# Patient Record
Sex: FEMALE | Race: WHITE | Employment: FULL TIME | URBAN - METROPOLITAN AREA
[De-identification: names, ages, dates, MRNs, and addresses within clinical notes are randomized per-mention and may not be internally consistent; named-entity substitution may affect disease eponyms.]

---

## 2017-01-03 ENCOUNTER — OFFICE VISIT (OUTPATIENT)
Dept: RHEUMATOLOGY | Age: 47
End: 2017-01-03

## 2017-01-03 VITALS
OXYGEN SATURATION: 99 % | BODY MASS INDEX: 45.99 KG/M2 | DIASTOLIC BLOOD PRESSURE: 83 MMHG | WEIGHT: 293 LBS | TEMPERATURE: 98.6 F | HEIGHT: 67 IN | HEART RATE: 76 BPM | SYSTOLIC BLOOD PRESSURE: 140 MMHG

## 2017-01-03 DIAGNOSIS — M32.9 LUPUS (HCC): Primary | ICD-10-CM

## 2017-01-03 RX ORDER — AZATHIOPRINE 50 MG/1
50 TABLET ORAL 2 TIMES DAILY
Qty: 120 TAB | Refills: 2 | Status: SHIPPED | OUTPATIENT
Start: 2017-01-03 | End: 2017-04-03

## 2017-01-03 RX ORDER — HYDROXYCHLOROQUINE SULFATE 200 MG/1
400 TABLET, FILM COATED ORAL DAILY
Qty: 180 TAB | Refills: 1 | Status: SHIPPED | OUTPATIENT
Start: 2017-01-03 | End: 2017-04-03

## 2017-01-03 RX ORDER — METHYLPREDNISOLONE 4 MG/1
TABLET ORAL
Refills: 0 | COMMUNITY
Start: 2016-12-31 | End: 2017-05-30 | Stop reason: ALTCHOICE

## 2017-01-03 NOTE — MR AVS SNAPSHOT
Visit Information Date & Time Provider Department Dept. Phone Encounter #  
 1/3/2017  4:30 PM Tre Louie MD [facility] [id number] Follow-up Instructions Return in about 3 months (around 4/3/2017). Upcoming Health Maintenance Date Due COLONOSCOPY 8/1/1986 BREAST CANCER SCRN MAMMOGRAM 4/13/2016 INFLUENZA AGE 9 TO ADULT 8/1/2016 PAP AKA CERVICAL CYTOLOGY 3/2/2018 DTaP/Tdap/Td series (2 - Td) 10/20/2024 Allergies as of 1/3/2017  Review Complete On: 1/3/2017 By: Radha Romo LPN Severity Noted Reaction Type Reactions Aspirin High 01/05/2015    Anaphylaxis Peanut High 01/05/2015    Anaphylaxis Other Plant, Animal, Environmental  06/29/2015    Runny Nose, Sneezing Hayfever Current Immunizations  Reviewed on 1/5/2015 Name Date Influenza Vaccine 10/20/2014 MMR 12/1/1989 TB Skin Test (PPD) 12/1/1989 Tdap 10/20/2014 Not reviewed this visit You Were Diagnosed With   
  
 Codes Comments Lupus ([facility] 75.)    -  Primary ICD-10-CM: M32.9 ICD-9-CM: 710.0 Vitals BP Pulse Temp Height(growth percentile) Weight(growth percentile) LMP  
 140/83 (BP 1 Location: Right arm, BP Patient Position: Sitting) 76 98.6 °F (37 °C) (Oral) 5' 7\" (1.702 m) (!) 351 lb 6.4 oz (159.4 kg) 12/03/2013 SpO2 BMI OB Status Smoking Status 99% 55.04 kg/m2 Hysterectomy Never Smoker Vitals History BMI and BSA Data Body Mass Index Body Surface Area 55.04 kg/m 2 2.75 m 2 Preferred Pharmacy Pharmacy Name Phone [address] [facility] [phone] Your Updated Medication List  
  
   
This list is accurate as of: 1/3/17  5:09 PM.  Always use your most recent med list.  
  
  
  
  
 azaTHIOprine 50 mg tablet Commonly known as:  The Pepsi Take 1 Tab by mouth two (2) times a day for 90 days. buPROPion  mg tablet Commonly known as:  WELLBUTRIN XL  
TAKE 1 TABLET BY MOUTH EVERY MORNING  
  
 citalopram 40 mg tablet Commonly known as:  CELEXA  
TAKE 1 TABLET BY MOUTH EVERY DAY  
  
 HYDROcodone-acetaminophen 5-325 mg per tablet Commonly known as:  Cindy Mcnair Take 1 Tab by mouth every four (4) hours as needed for Pain. Max Daily Amount: 6 Tabs. hydroxychloroquine 200 mg tablet Commonly known as:  PLAQUENIL Take 2 Tabs by mouth daily for 90 days. levothyroxine 150 mcg tablet Commonly known as:  SYNTHROID Take 1 Tab by mouth Daily (before breakfast). Needs labs LORazepam 1 mg tablet Commonly known as:  ATIVAN Take 1 Tab by mouth every eight (8) hours as needed for Anxiety. Max Daily Amount: 3 mg.  
  
 methylPREDNISolone 4 mg tablet Commonly known as:  Dianne Blajaymie TK UTD  
  
 metoprolol tartrate 50 mg tablet Commonly known as:  LOPRESSOR Take 1 Tab by mouth as needed. Take  50 mg 12 hours prior to testing and 1 hour before testing. naproxen sodium 220 mg tablet Commonly known as:  NAPROSYN Take 220 mg by mouth as needed. omeprazole 20 mg tablet Commonly known as:  PRILOSEC OTC Take 40 mg by mouth two (2) times a day. ondansetron 4 mg disintegrating tablet Commonly known as:  ZOFRAN ODT Take 1 Tab by mouth every eight (8) hours as needed for Nausea. pantoprazole 40 mg tablet Commonly known as:  PROTONIX Take 40 mg by mouth two (2) times a day. VITAMIN D3 2,000 unit Tab Generic drug:  cholecalciferol (vitamin D3) Take  by mouth. Prescriptions Sent to Pharmacy Refills  
 azaTHIOprine (IMURAN) 50 mg tablet 2 Sig: Take 1 Tab by mouth two (2) times a day for 90 days. Class: Normal  
 Pharmacy: [address], [address] [place] [address] Ph #: [phone]  Route: Oral  
 hydroxychloroquine (PLAQUENIL) 200 mg tablet 1  
 Sig: Take 2 Tabs by mouth daily for 90 days. Class: Normal  
 Pharmacy: [address], [address] [facility] #: [phone] Route: Oral  
  
Follow-up Instructions Return in about 3 months (around 4/3/2017). Introducing [facility] SERVICES! Dear Morrie Mohs: Thank you for requesting a [employer] account. Our records indicate that you already have an active [employer] account. You can access your account anytime at [email or web address] Did you know that you can access your hospital and ER discharge instructions at any time in [employer]? You can also review all of your test results from your hospital stay or ER visit. Additional Information If you have questions, please visit the Frequently Asked Questions section of the [employer] website at [email or web address] Remember, [employer] is NOT to be used for urgent needs. For medical emergencies, dial 911. Now available from your iPhone and Android! Please provide this summary of care documentation to your next provider. Your primary care clinician is listed as Charisma Prieto. If you have any questions after today's visit, please call [phone].

## 2017-01-03 NOTE — PROGRESS NOTES
RHEUMATOLOGY PROBLEM LIST AND CHIEF COMPLAINT  1. Lupus (1999) -  fatigue, arthralgia, rash (livedo reticularis), pleurisy, interstitial changes however no interstitial lung disease, proteinuria, fevers, dry eyes, dry mouth and Raynaud's phenomenon, SHREYA, SSA, SSB and double-stranded DNA. Therapy History:  Prior NSAIDs:   Prior DMARDs: Cellcept (approx. 6 months, stopped because of size of pill)  Current NSAIDs:  Current DMARDs: Imuran ([id number], restarted 09/2016-current), Hydroxychloroquine (stopped 10/2016, reordered 1/2017)    2. Sjogrens syndrome - dry eyes, dry mouth and Raynaud's phenomenon    INTERVAL HISTORY  This is a 48 y.o.  female. Today, the patient complains of pain in the chest    Severity:  4 on a scale of 0-10. Timing: all day, modifying factor being steroids  Context/Associated signs and symptoms: The patient states that she had a bad week last week and reports going to her primary care. She states that she was having chest pain while breathing and tenderness over the chest and ribs and thinks it was from costochondritis. She admits that this chest pain is worse when lying flat. She states that she was given a steroid dose pack which provided relief. She reports that she has had 3 upper respiratory infections since starting Imuran. She denies any improvement in her joint symptoms since her previous visit. She states that she stopped her plaquenil because she thought she was supposed to stop it when starting imuran. She continue on Imuran 50 mg BID. She reports that her most recent eye exam was good.      RHEUMATOLOGY REVIEW OF SYSTEMS   Positives as per history  Negatives as follows:  Osmel Choi:  Denies unexplained persistent fevers or weight change  RESPIRATORY:  No pleuritic pain, exertional dyspnea  CARDIOVASCULAR:  Denies chest pain  GASTRO:   Denies heartburn, abdominal pain, nausea, vomiting, diarrhea  SKIN:    Denies rash   MSK:    No morning stiffness >1 hour, persistent joint swelling, persistent joint pain    PAST MEDICAL HISTORY  Reviewed with patient, significant changes in medical history - Yes, recent episodes of pleurisy    FAMILY HISTORY  autoimmune thyroid disease - mother    PHYSICAL EXAM  Blood pressure 140/83, pulse 76, temperature 98.6 °F (37 °C), temperature source Oral, height 5' 7\" (1.702 m), weight (!) 351 lb 6.4 oz (159.4 kg), last menstrual period 12/03/2013, SpO2 99 %. GENERAL APPEARANCE: Well-nourished, no acute distress  NECK: Tender cervical lymphadenopathy  ENT: No oral ulcers  CARDIOVASCULAR: Heart rhythm is regular. No murmur, rub, gallop  CHEST: Normal vesicular breath sounds. No wheezes, rales, pleural friction rubs, no pleurisy today  ABDOMINAL: The abdomen is soft and nontender. Bowel sounds are normal  SKIN: No rash, palpable purpura, digital ulcer, abnormal thickening   MUSCULOSKELETAL:   Upper extremities - Tenderness over MCP's, puffiness has persisted  Lower extremities - bony prominence of bilateral knees with no tenderness on range of motion    LABS, RADIOLOGY AND PROCEDURES  Previous labs reviewed -Yes    ASSESSMENT  1. Lupus (Established problem - Progressive disease) - the patient's pleurisy has been flaring recently. This is likely because she stopped her plaquenil after starting Imuran. I explained that the two medications work synergistically and that she should continue taking both. I want her to restart plaquenil 400 mg daily and continue Imuran 50 mg BID. She has had multiple upper respiratory infections recently, so if she continues to get sick I will likely stop her Imuran and start her on a new medication, like Benlysta. She should finish her prednisone dose pack. I will check her toxicity labs today. She should return in 3 months for a follow up. 2. Sjogren syndrome (Established problem - Stable disease) - the patient's disease is stable. She will use symptomatic treatments for dry mouth.    3. Drug therapy monitoring for toxicity (plaquenil ) - CBC, BUN, Cr, AST, ALT and albumin every 3 months; eye exams every 6-12 months for retinal toxicity. PLAN  1. Restart Hydroxychloroquine 400 mg daily   2. Toxicity labs - CBC, BUN, Cr, AST, ALT and albumin  3. Imuran 50 mg BID    Stanislaw Deng MD, [address] [place] [address]   Adult and Pediatric Rheumatology     [facility] of [address] [place] [address], Phone [phone], Fax [phone]     Visiting  of Pediatrics    Department of Pediatrics, [address], Phone [phone], Fax [phone]    There are no Patient Instructions on file for this visit. Follow-up Disposition:  Return in about 3 months (around 4/3/2017). Inessa Gomez MD    Written by zulema Epps, as dictated by Justin Leong.  Jethro Deng M.D.

## 2017-01-04 LAB
ALBUMIN SERPL-MCNC: 4.3 G/DL (ref 3.5–5.5)
ALT SERPL-CCNC: 18 IU/L (ref 0–32)
AST SERPL-CCNC: 12 IU/L (ref 0–40)
BASOPHILS # BLD MANUAL: 0 X10E3/UL (ref 0–0.2)
BASOPHILS NFR BLD MANUAL: 0 %
BUN SERPL-MCNC: 15 MG/DL (ref 6–24)
CREAT SERPL-MCNC: 0.6 MG/DL (ref 0.57–1)
DIFFERENTIAL COMMENT, 115260: ABNORMAL
EOSINOPHIL # BLD MANUAL: 0.1 X10E3/UL (ref 0–0.4)
EOSINOPHIL NFR BLD MANUAL: 1 %
ERYTHROCYTE [DISTWIDTH] IN BLOOD BY AUTOMATED COUNT: 18.4 % (ref 12.3–15.4)
HCT VFR BLD AUTO: 36.7 % (ref 34–46.6)
HGB BLD-MCNC: 11.1 G/DL (ref 11.1–15.9)
LYMPHOCYTES # BLD MANUAL: 2 X10E3/UL (ref 0.7–3.1)
LYMPHOCYTES NFR BLD MANUAL: 14 %
MCH RBC QN AUTO: 20.9 PG (ref 26.6–33)
MCHC RBC AUTO-ENTMCNC: 30.2 G/DL (ref 31.5–35.7)
MCV RBC AUTO: 69 FL (ref 79–97)
MONOCYTES # BLD MANUAL: 0.7 X10E3/UL (ref 0.1–0.9)
MONOCYTES NFR BLD MANUAL: 5 %
NEUTROPHILS # BLD MANUAL: 11.4 X10E3/UL (ref 1.4–7)
NEUTROPHILS NFR BLD MANUAL: 80 %
NRBC BLD AUTO-RTO: 0 %
PLATELET # BLD AUTO: 535 X10E3/UL (ref 150–379)
PLATELET BLD QL SMEAR: ABNORMAL
RBC # BLD AUTO: 5.31 X10E6/UL (ref 3.77–5.28)
RBC MORPH BLD: ABNORMAL
WBC # BLD AUTO: 14.2 X10E3/UL (ref 3.4–10.8)

## 2017-02-14 ENCOUNTER — TELEPHONE (OUTPATIENT)
Dept: RHEUMATOLOGY | Age: 47
End: 2017-02-14

## 2017-02-14 RX ORDER — PREDNISONE 5 MG/1
TABLET ORAL
Qty: 30 TAB | Refills: 1 | Status: ON HOLD | OUTPATIENT
Start: 2017-02-14 | End: 2017-08-25

## 2017-02-14 RX ORDER — PREDNISONE 5 MG/1
TABLET ORAL
Qty: 30 TAB | Refills: 1 | Status: SHIPPED | OUTPATIENT
Start: 2017-02-14 | End: 2017-02-14 | Stop reason: SDUPTHER

## 2017-02-14 NOTE — TELEPHONE ENCOUNTER
Patient would like a call back from nurse she is having a lupus flare joints hurting fatigue no soon appointments available [phone]

## 2017-02-14 NOTE — TELEPHONE ENCOUNTER
Returned patient's call [phone] she has been experiencing increased joint pain all over as well as chest discomfort and shortness of breath at times. Patient currently is taking Plaquenil & Imuran as prescribed . Would like to know if you could send a steroid dose pack to [facility] on file.

## 2017-05-16 ENCOUNTER — TELEPHONE (OUTPATIENT)
Dept: RHEUMATOLOGY | Age: 47
End: 2017-05-16

## 2017-05-16 NOTE — TELEPHONE ENCOUNTER
Patient would like a call back from nurse states possible lupus flare up she did a finger stick and her hemoglobin was 8.9  [phone]

## 2017-05-17 ENCOUNTER — TELEPHONE (OUTPATIENT)
Dept: RHEUMATOLOGY | Age: 47
End: 2017-05-17

## 2017-05-17 DIAGNOSIS — D63.8 ANEMIA IN OTHER CHRONIC DISEASES CLASSIFIED ELSEWHERE: ICD-10-CM

## 2017-05-17 DIAGNOSIS — M32.12 SYSTEMIC LUPUS ERYTHEMATOSUS (SLE) WITH PERICARDITIS, UNSPECIFIED SLE TYPE (HCC): ICD-10-CM

## 2017-05-17 DIAGNOSIS — M35.00 SJOGREN'S SYNDROME, WITH UNSPECIFIED ORGAN INVOLVEMENT (HCC): Primary | ICD-10-CM

## 2017-05-17 NOTE — TELEPHONE ENCOUNTER
Called patient she feels she may be having a lupus flare she did a finger stick and her hemoglobin was 8.9. She is not sure why it would be low considering she had a hysterectomy fours ago so she does not have monthly periods. She has been experiencing extreme fatigue and some shortness of breath. Patient would like to  Know if this could be related to her Lupus or any of the medications she currently is taking. Please advise how to proceed.

## 2017-05-18 NOTE — TELEPHONE ENCOUNTER
Called patient advised per Dr Mario Rico it can be related to Lupus.  We can check some labs to see if she is having hemolytic anemia. Patient stated she would like to have labs drawn.

## 2017-05-26 LAB
ALBUMIN SERPL-MCNC: 4.1 G/DL (ref 3.5–5.5)
ALBUMIN/GLOB SERPL: 1.5 {RATIO} (ref 1.2–2.2)
ALP SERPL-CCNC: 91 IU/L (ref 39–117)
ALT SERPL-CCNC: 11 IU/L (ref 0–32)
ANA NUCLEAR DOTS TITR SER IF: ABNORMAL {TITER}
ANA SPECKLED TITR SER: ABNORMAL {TITER}
ANA TITR SER IF: POSITIVE {TITER}
APPEARANCE UR: CLEAR
AST SERPL-CCNC: 14 IU/L (ref 0–40)
BACTERIA #/AREA URNS HPF: NORMAL /[HPF]
BASOPHILS # BLD MANUAL: 0 X10E3/UL (ref 0–0.2)
BASOPHILS NFR BLD MANUAL: 0 %
BILIRUB SERPL-MCNC: 0.4 MG/DL (ref 0–1.2)
BILIRUB UR QL STRIP: NEGATIVE
BUN SERPL-MCNC: 10 MG/DL (ref 6–24)
BUN/CREAT SERPL: 16 (ref 9–23)
C3 SERPL-MCNC: 176 MG/DL (ref 82–167)
C4 SERPL-MCNC: 25 MG/DL (ref 14–44)
CALCIUM SERPL-MCNC: 9.1 MG/DL (ref 8.7–10.2)
CASTS URNS QL MICRO: NORMAL /LPF
CHLORIDE SERPL-SCNC: 98 MMOL/L (ref 96–106)
CO2 SERPL-SCNC: 24 MMOL/L (ref 18–29)
COLOR UR: YELLOW
CREAT SERPL-MCNC: 0.63 MG/DL (ref 0.57–1)
CREAT UR-MCNC: 137.9 MG/DL
CRP SERPL-MCNC: 15 MG/L (ref 0–4.9)
DIFFERENTIAL COMMENT, 115260: ABNORMAL
DSDNA (NDNA) SCRN BY CRITHIDIA: NORMAL TITER
EOSINOPHIL # BLD MANUAL: 0.3 X10E3/UL (ref 0–0.4)
EOSINOPHIL NFR BLD MANUAL: 3 %
EPI CELLS #/AREA URNS HPF: NORMAL /HPF
ERYTHROCYTE [DISTWIDTH] IN BLOOD BY AUTOMATED COUNT: 18.2 % (ref 12.3–15.4)
ERYTHROCYTE [SEDIMENTATION RATE] IN BLOOD BY WESTERGREN METHOD: 22 MM/HR (ref 0–32)
FOLATE SERPL-MCNC: 7.5 NG/ML
GLOBULIN SER CALC-MCNC: 2.8 G/DL (ref 1.5–4.5)
GLUCOSE SERPL-MCNC: 122 MG/DL (ref 65–99)
GLUCOSE UR QL: NEGATIVE
HCT VFR BLD AUTO: 32.2 % (ref 34–46.6)
HGB BLD-MCNC: 9.3 G/DL (ref 11.1–15.9)
HGB UR QL STRIP: NEGATIVE
IGA SERPL-MCNC: 438 MG/DL (ref 87–352)
IGG SERPL-MCNC: 1025 MG/DL (ref 700–1600)
IGM SERPL-MCNC: 72 MG/DL (ref 26–217)
IRON SATN MFR SERPL: 5 % (ref 15–55)
IRON SERPL-MCNC: 18 UG/DL (ref 27–159)
KETONES UR QL STRIP: NEGATIVE
LEUKOCYTE ESTERASE UR QL STRIP: NEGATIVE
LYMPHOCYTES # BLD MANUAL: 1.6 X10E3/UL (ref 0.7–3.1)
LYMPHOCYTES NFR BLD MANUAL: 17 %
Lab: ABNORMAL
MCH RBC QN AUTO: 19.3 PG (ref 26.6–33)
MCHC RBC AUTO-ENTMCNC: 28.9 G/DL (ref 31.5–35.7)
MCV RBC AUTO: 67 FL (ref 79–97)
MICRO URNS: NORMAL
MICRO URNS: NORMAL
MONOCYTES # BLD MANUAL: 0.4 X10E3/UL (ref 0.1–0.9)
MONOCYTES NFR BLD MANUAL: 4 %
MUCOUS THREADS URNS QL MICRO: PRESENT
NEUTROPHILS # BLD MANUAL: 7.1 X10E3/UL (ref 1.4–7)
NEUTROPHILS NFR BLD MANUAL: 76 %
NITRITE UR QL STRIP: NEGATIVE
PH UR STRIP: 6 [PH] (ref 5–7.5)
PLATELET # BLD AUTO: 508 X10E3/UL (ref 150–379)
PLATELET BLD QL SMEAR: ABNORMAL
POTASSIUM SERPL-SCNC: 4.1 MMOL/L (ref 3.5–5.2)
PROT SERPL-MCNC: 6.9 G/DL (ref 6–8.5)
PROT UR QL STRIP: NEGATIVE
PROT UR-MCNC: 12 MG/DL
RBC # BLD AUTO: 4.82 X10E6/UL (ref 3.77–5.28)
RBC #/AREA URNS HPF: NORMAL /HPF
RBC MORPH BLD: ABNORMAL
SODIUM SERPL-SCNC: 140 MMOL/L (ref 134–144)
SP GR UR: 1.02 (ref 1–1.03)
TIBC SERPL-MCNC: 370 UG/DL (ref 250–450)
UIBC SERPL-MCNC: 352 UG/DL (ref 131–425)
URINALYSIS REFLEX, 377202: NORMAL
UROBILINOGEN UR STRIP-MCNC: 0.2 MG/DL (ref 0.2–1)
VIT B12 SERPL-MCNC: 416 PG/ML (ref 211–946)
WBC # BLD AUTO: 9.4 X10E3/UL (ref 3.4–10.8)
WBC #/AREA URNS HPF: NORMAL /HPF

## 2017-05-30 ENCOUNTER — OFFICE VISIT (OUTPATIENT)
Dept: RHEUMATOLOGY | Age: 47
End: 2017-05-30

## 2017-05-30 VITALS
TEMPERATURE: 98.1 F | BODY MASS INDEX: 45.99 KG/M2 | OXYGEN SATURATION: 98 % | SYSTOLIC BLOOD PRESSURE: 119 MMHG | HEIGHT: 67 IN | RESPIRATION RATE: 16 BRPM | WEIGHT: 293 LBS | HEART RATE: 87 BPM | DIASTOLIC BLOOD PRESSURE: 77 MMHG

## 2017-05-30 DIAGNOSIS — M32.19 OTHER SYSTEMIC LUPUS ERYTHEMATOSUS WITH OTHER ORGAN INVOLVEMENT (HCC): Primary | ICD-10-CM

## 2017-05-30 RX ORDER — AZATHIOPRINE 50 MG/1
150 TABLET ORAL DAILY
Qty: 270 TAB | Refills: 3 | Status: ON HOLD | OUTPATIENT
Start: 2017-05-30 | End: 2017-08-25 | Stop reason: SDUPTHER

## 2017-05-30 RX ORDER — AZATHIOPRINE 50 MG/1
50 TABLET ORAL 3 TIMES DAILY
COMMUNITY
End: 2018-08-09 | Stop reason: SDUPTHER

## 2017-05-30 RX ORDER — HYDROXYCHLOROQUINE SULFATE 200 MG/1
200 TABLET, FILM COATED ORAL 2 TIMES DAILY
COMMUNITY
End: 2019-03-25 | Stop reason: ALTCHOICE

## 2017-05-30 NOTE — MR AVS SNAPSHOT
Visit Information Date & Time Provider Department Dept. Phone Encounter #  
 5/30/2017  4:30 PM Aniceto Lawrence MD [facility] [id number] Upcoming Health Maintenance Date Due COLONOSCOPY 8/1/1986 BREAST CANCER SCRN MAMMOGRAM 4/13/2016 INFLUENZA AGE 9 TO ADULT 8/1/2017 PAP AKA CERVICAL CYTOLOGY 3/2/2018 DTaP/Tdap/Td series (2 - Td) 10/20/2024 Allergies as of 5/30/2017  Review Complete On: 5/30/2017 By: Lucy Bassett LPN Severity Noted Reaction Type Reactions Aspirin High 01/05/2015    Anaphylaxis Peanut High 01/05/2015    Anaphylaxis Other Plant, Animal, Environmental  06/29/2015    Runny Nose, Sneezing Hayfever Current Immunizations  Reviewed on 1/5/2015 Name Date Influenza Vaccine 10/20/2014 MMR 12/1/1989 TB Skin Test (PPD) 12/1/1989 Tdap 10/20/2014 Not reviewed this visit You Were Diagnosed With   
  
 Codes Comments Other systemic lupus erythematosus with other organ involvement ([facility]    -  Primary ICD-10-CM: M32.19 Vitals BP Pulse Temp Resp Height(growth percentile) Weight(growth percentile) 119/77 (BP 1 Location: Right arm, BP Patient Position: Sitting) 87 98.1 °F (36.7 °C) (Oral) 16 5' 7\" (1.702 m) 349 lb 12.8 oz (158.7 kg) LMP SpO2 BMI OB Status Smoking Status 12/03/2013 98% 54.79 kg/m2 Hysterectomy Never Smoker BMI and BSA Data Body Mass Index Body Surface Area 54.79 kg/m 2 2.74 m 2 Preferred Pharmacy Pharmacy Name Phone [facility] [phone] Your Updated Medication List  
  
   
This list is accurate as of: 5/30/17  4:58 PM.  Always use your most recent med list.  
  
  
  
  
 buPROPion  mg tablet Commonly known as:  WELLBUTRIN XL  
TAKE 1 TABLET BY MOUTH EVERY MORNING  
  
 citalopram 40 mg tablet Commonly known as:  Omid Bowles TAKE 1 TABLET BY MOUTH EVERY DAY  
  
 HYDROcodone-acetaminophen 5-325 mg per tablet Commonly known as:  Desire Jian Take 1 Tab by mouth every four (4) hours as needed for Pain. Max Daily Amount: 6 Tabs. * IMURAN 50 mg tablet Generic drug:  azaTHIOprine Take 50 mg by mouth two (2) times a day. * azaTHIOprine 50 mg tablet Commonly known as:  The Pepsi Take 3 Tabs by mouth daily for 90 days. levothyroxine 150 mcg tablet Commonly known as:  SYNTHROID Take 1 Tab by mouth Daily (before breakfast). Needs labs LORazepam 1 mg tablet Commonly known as:  ATIVAN Take 1 Tab by mouth every eight (8) hours as needed for Anxiety. Max Daily Amount: 3 mg.  
  
 metoprolol tartrate 50 mg tablet Commonly known as:  LOPRESSOR Take 1 Tab by mouth as needed. Take  50 mg 12 hours prior to testing and 1 hour before testing. naproxen sodium 220 mg tablet Commonly known as:  NAPROSYN Take 220 mg by mouth as needed. omeprazole 20 mg tablet Commonly known as:  PRILOSEC OTC Take 40 mg by mouth two (2) times a day. ondansetron 4 mg disintegrating tablet Commonly known as:  ZOFRAN ODT Take 1 Tab by mouth every eight (8) hours as needed for Nausea. pantoprazole 40 mg tablet Commonly known as:  PROTONIX Take 40 mg by mouth two (2) times a day. PLAQUENIL 200 mg tablet Generic drug:  hydroxychloroquine Take 200 mg by mouth two (2) times a day. predniSONE 5 mg tablet Commonly known as:  DELTASONE  
20mg x 3 days, 15mg x 3 days, 10mg x 3 days, 5mg x 3 days VITAMIN D3 2,000 unit Tab Generic drug:  cholecalciferol (vitamin D3) Take  by mouth. * Notice: This list has 2 medication(s) that are the same as other medications prescribed for you. Read the directions carefully, and ask your doctor or other care provider to review them with you. Prescriptions Sent to Pharmacy  Refills  
 azaTHIOprine (IMURAN) 50 mg tablet 3  
 Sig: Take 3 Tabs by mouth daily for 90 days. Class: Normal  
 Pharmacy: [address], [address] [facility] #: [phone] Route: Oral  
  
Introducing [facility] SERVICES! Dear Cherry Gale: Thank you for requesting a [employer] account. Our records indicate that you already have an active [employer] account. You can access your account anytime at [email or web address] Did you know that you can access your hospital and ER discharge instructions at any time in [employer]? You can also review all of your test results from your hospital stay or ER visit. Additional Information If you have questions, please visit the Frequently Asked Questions section of the [employer] website at [email or web address] Remember, [employer] is NOT to be used for urgent needs. For medical emergencies, dial 911. Now available from your iPhone and Android! Please provide this summary of care documentation to your next provider. Your primary care clinician is listed as Mikayla Brown. If you have any questions after today's visit, please call [phone].

## 2017-05-30 NOTE — PROGRESS NOTES
RHEUMATOLOGY PROBLEM LIST AND CHIEF COMPLAINT  1. Lupus (1999) -  fatigue, arthralgia, rash (livedo reticularis), pleurisy, interstitial changes however no interstitial lung disease, proteinuria, fevers, dry eyes, dry mouth and Raynaud's phenomenon, SHREYA, SSA, SSB and double-stranded DNA. Therapy History:  Prior DMARDs: Cellcept (approx. 6 months, stopped because of size of pill)  Current DMARDs: Imuran ([id number], restarted 09/2016-current), Hydroxychloroquine (stopped 10/2016, 1/2017-current)    2. Sjogrens syndrome - dry eyes, dry mouth and Raynaud's phenomenon    INTERVAL HISTORY  This is a 48 y.o.  female. Today, the patient complains of pain in the joints. Location: hand  Severity:  0 on a scale of 0-10  Timing: all day   Duration:  4 months  Modifying factors: Imuran, Prednisone, HCQ  Context/Associated signs and symptoms: The patient states that her joints are doing well today, but she admits to having more pain \"with the bad weather. \" She recently had a flare with pain, fatigue, and hand swelling so she was started on prednisone course with relief. She notes that her recent labs have showed anemia. She restarted plaquenil 400 mg daily and she continues on Imuran 50 mg BID. RHEUMATOLOGY REVIEW OF SYSTEMS   Positives as per history  Negatives as follows:  Catalina Saba:  Denies unexplained persistent fevers or weight change  RESPIRATORY:  No pleuritic pain, exertional dyspnea  CARDIOVASCULAR:  Denies chest pain  GASTRO:   Denies heartburn, abdominal pain, nausea, vomiting, diarrhea  SKIN:    Denies rash   MSK:    No morning stiffness >1 hour, persistent joint swelling, persistent joint pain    PAST MEDICAL HISTORY  Reviewed with patient, significant changes in medical history - Yes, recent flare    FAMILY HISTORY  autoimmune thyroid disease - mother    PHYSICAL EXAM  Blood pressure 119/77, pulse 87, temperature 98.1 °F (36.7 °C), temperature source Oral, resp.  rate 16, height 5' 7\" (1.702 m), weight 349 lb 12.8 oz (158.7 kg), last menstrual period 12/03/2013, SpO2 98 %. GENERAL APPEARANCE: Well-nourished, no acute distress  NECK: Tender cervical lymphadenopathy  ENT: No oral ulcers  CARDIOVASCULAR: Heart rhythm is regular. No murmur, rub, gallop  CHEST: Normal vesicular breath sounds. No wheezes, rales, pleural friction rubs, no pleurisy today  ABDOMINAL: The abdomen is soft and nontender. Bowel sounds are normal  SKIN: No rash, palpable purpura, digital ulcer, abnormal thickening   MUSCULOSKELETAL:   Upper extremities - full range of motion, no tenderness, no swelling, no synovial thickening and no deformity of joints  Lower extremities - bony prominence of bilateral knees with no tenderness on range of motion    LABS, RADIOLOGY AND PROCEDURES  Previous labs reviewed -Yes    ASSESSMENT  1. Lupus (Established problem - Stable disease) - the patient flared recently. She was started on prednisone and her flare resolved. I explained that she may need additional treatment to prevent flares, so I will increase her Imuran to 150 mg daily. I want her to continue on plaquenil 400 mg daily along with her Imuran. I believe that her anemia is a result of chronic inflammation, so I will continue to monitor this. 2. Sjogren syndrome (Established problem - Stable disease) - the patient's disease is stable. She will use symptomatic treatments for dry mouth. 3. Drug therapy monitoring for toxicity (plaquenil ) - CBC, BUN, Cr, AST, ALT and albumin every 3 months; eye exams every 6-12 months for retinal toxicity. monitor   PLAN  1. Hydroxychloroquine 400 mg daily   2. Increase Imuran to 150 mg daily    Stanislaw Muniz MD  Adult and Pediatric Rheumatology     [address] [place] [address], Phone [phone], Fax [phone]     Visiting  of Pediatrics    Department of Pediatrics, [facility] [id number], [address] [place] [address], Phone [phone], Fax [phone]    There are no Patient Instructions on file for this visit. cc:  Dorothy Ellington MD    Written by zulema Mcghee, as dictated by Jimbo Pollard.  Latasha Britton M.D.

## 2017-06-08 ENCOUNTER — TELEPHONE (OUTPATIENT)
Dept: RHEUMATOLOGY | Age: 47
End: 2017-06-08

## 2017-06-08 RX ORDER — PREDNISONE 5 MG/1
TABLET ORAL
Qty: 30 TAB | Refills: 1 | Status: ON HOLD | OUTPATIENT
Start: 2017-06-08 | End: 2017-08-25

## 2017-07-20 ENCOUNTER — TELEPHONE (OUTPATIENT)
Dept: RHEUMATOLOGY | Age: 47
End: 2017-07-20

## 2017-07-20 NOTE — TELEPHONE ENCOUNTER
----- Message from Kt Fritz sent at 7/20/2017  1:47 PM EDT -----  Regarding: FW: Dr. Alpa Dietz      ----- Message -----     From: Dakotah Scott     Sent: 7/20/2017  12:44 PM       To: [facility] Front Office Pool  Subject: Dr. Alpa Dietz                              Pt requesting call back from nurse. Lupus is flaring up. She need to know what to do. Best contact number [phone].

## 2017-07-20 NOTE — TELEPHONE ENCOUNTER
Returned patient's call she has been experiencing increased joint pain all over, discomfort in her chest as well as her lower legs with extreme fatigue. She thinks some of it is related to her being anemic as well as a flare going on. Would like Prednisone sent to [facility] pharmacy unless you have any other suggestions.

## 2017-07-21 RX ORDER — PREDNISONE 5 MG/1
TABLET ORAL
Qty: 30 TAB | Refills: 1 | Status: ON HOLD | OUTPATIENT
Start: 2017-07-21 | End: 2017-08-25

## 2017-08-25 ENCOUNTER — ANESTHESIA (OUTPATIENT)
Dept: ENDOSCOPY | Age: 47
End: 2017-08-25
Payer: COMMERCIAL

## 2017-08-25 ENCOUNTER — HOSPITAL ENCOUNTER (OUTPATIENT)
Age: 47
Setting detail: OUTPATIENT SURGERY
Discharge: HOME | End: 2017-08-25
Attending: INTERNAL MEDICINE | Admitting: INTERNAL MEDICINE
Payer: COMMERCIAL

## 2017-08-25 ENCOUNTER — ANESTHESIA EVENT (OUTPATIENT)
Dept: ENDOSCOPY | Age: 47
End: 2017-08-25
Payer: COMMERCIAL

## 2017-08-25 VITALS
BODY MASS INDEX: 45.99 KG/M2 | RESPIRATION RATE: 19 BRPM | HEART RATE: 74 BPM | WEIGHT: 293 LBS | HEIGHT: 67 IN | OXYGEN SATURATION: 99 % | SYSTOLIC BLOOD PRESSURE: 110 MMHG | TEMPERATURE: 98.5 F | DIASTOLIC BLOOD PRESSURE: 63 MMHG

## 2017-08-25 PROCEDURE — 74011000250 HC RX REV CODE- 250

## 2017-08-25 PROCEDURE — 74011250636 HC RX REV CODE- 250/636

## 2017-08-25 PROCEDURE — 88305 TISSUE EXAM BY PATHOLOGIST: CPT | Performed by: INTERNAL MEDICINE

## 2017-08-25 PROCEDURE — 77030019988 HC FCPS ENDOSC DISP BSC -B: Performed by: INTERNAL MEDICINE

## 2017-08-25 PROCEDURE — 74011250636 HC RX REV CODE- 250/636: Performed by: INTERNAL MEDICINE

## 2017-08-25 PROCEDURE — 76060000032 HC ANESTHESIA 0.5 TO 1 HR: Performed by: INTERNAL MEDICINE

## 2017-08-25 PROCEDURE — 76040000007: Performed by: INTERNAL MEDICINE

## 2017-08-25 RX ORDER — PROPOFOL 10 MG/ML
INJECTION, EMULSION INTRAVENOUS AS NEEDED
Status: DISCONTINUED | OUTPATIENT
Start: 2017-08-25 | End: 2017-08-25 | Stop reason: HOSPADM

## 2017-08-25 RX ORDER — SODIUM CHLORIDE 0.9 % (FLUSH) 0.9 %
5-10 SYRINGE (ML) INJECTION AS NEEDED
Status: DISCONTINUED | OUTPATIENT
Start: 2017-08-25 | End: 2017-08-25 | Stop reason: HOSPADM

## 2017-08-25 RX ORDER — MIDAZOLAM HYDROCHLORIDE 1 MG/ML
1-3 INJECTION, SOLUTION INTRAMUSCULAR; INTRAVENOUS
Status: DISCONTINUED | OUTPATIENT
Start: 2017-08-25 | End: 2017-08-25 | Stop reason: HOSPADM

## 2017-08-25 RX ORDER — NALOXONE HYDROCHLORIDE 0.4 MG/ML
0.4 INJECTION, SOLUTION INTRAMUSCULAR; INTRAVENOUS; SUBCUTANEOUS
Status: DISCONTINUED | OUTPATIENT
Start: 2017-08-25 | End: 2017-08-25 | Stop reason: HOSPADM

## 2017-08-25 RX ORDER — SUCRALFATE 1 G/10ML
1 SUSPENSION ORAL 4 TIMES DAILY
Qty: 560 ML | Refills: 0 | Status: SHIPPED | OUTPATIENT
Start: 2017-08-25 | End: 2017-09-08

## 2017-08-25 RX ORDER — MIDAZOLAM HYDROCHLORIDE 1 MG/ML
.25-5 INJECTION, SOLUTION INTRAMUSCULAR; INTRAVENOUS
Status: DISCONTINUED | OUTPATIENT
Start: 2017-08-25 | End: 2017-08-25 | Stop reason: HOSPADM

## 2017-08-25 RX ORDER — FLUMAZENIL 0.1 MG/ML
0.2 INJECTION INTRAVENOUS
Status: DISCONTINUED | OUTPATIENT
Start: 2017-08-25 | End: 2017-08-25 | Stop reason: HOSPADM

## 2017-08-25 RX ORDER — ATROPINE SULFATE 0.1 MG/ML
0.5 INJECTION INTRAVENOUS
Status: DISCONTINUED | OUTPATIENT
Start: 2017-08-25 | End: 2017-08-25 | Stop reason: HOSPADM

## 2017-08-25 RX ORDER — EPINEPHRINE 0.1 MG/ML
1 INJECTION INTRACARDIAC; INTRAVENOUS
Status: DISCONTINUED | OUTPATIENT
Start: 2017-08-25 | End: 2017-08-25 | Stop reason: HOSPADM

## 2017-08-25 RX ORDER — DEXTROMETHORPHAN/PSEUDOEPHED 2.5-7.5/.8
1.2 DROPS ORAL
Status: DISCONTINUED | OUTPATIENT
Start: 2017-08-25 | End: 2017-08-25 | Stop reason: HOSPADM

## 2017-08-25 RX ORDER — SODIUM CHLORIDE 0.9 % (FLUSH) 0.9 %
5-10 SYRINGE (ML) INJECTION EVERY 8 HOURS
Status: DISCONTINUED | OUTPATIENT
Start: 2017-08-25 | End: 2017-08-25 | Stop reason: HOSPADM

## 2017-08-25 RX ORDER — LIDOCAINE HYDROCHLORIDE 20 MG/ML
INJECTION, SOLUTION EPIDURAL; INFILTRATION; INTRACAUDAL; PERINEURAL AS NEEDED
Status: DISCONTINUED | OUTPATIENT
Start: 2017-08-25 | End: 2017-08-25 | Stop reason: HOSPADM

## 2017-08-25 RX ORDER — SODIUM CHLORIDE 9 MG/ML
75 INJECTION, SOLUTION INTRAVENOUS CONTINUOUS
Status: DISCONTINUED | OUTPATIENT
Start: 2017-08-25 | End: 2017-08-25 | Stop reason: HOSPADM

## 2017-08-25 RX ADMIN — PROPOFOL 100 MG: 10 INJECTION, EMULSION INTRAVENOUS at 14:07

## 2017-08-25 RX ADMIN — LIDOCAINE HYDROCHLORIDE 50 MG: 20 INJECTION, SOLUTION EPIDURAL; INFILTRATION; INTRACAUDAL; PERINEURAL at 14:07

## 2017-08-25 RX ADMIN — PROPOFOL 180 MG: 10 INJECTION, EMULSION INTRAVENOUS at 14:17

## 2017-08-25 RX ADMIN — SODIUM CHLORIDE 75 ML/HR: 900 INJECTION, SOLUTION INTRAVENOUS at 13:46

## 2017-08-25 RX ADMIN — PROPOFOL 250 MG: 10 INJECTION, EMULSION INTRAVENOUS at 14:28

## 2017-08-25 NOTE — PROCEDURES
[place] Office: [phone]      Esophagogastroduodenoscopy Procedure Note      Ashley Alicea  1970  [id number]    Indication: NATANAEL     : Linda Coffey MD    Referring Provider:  Sonia Melo MD    Sedation:  MAC anesthesia Propofol    Procedure Details:  After detailed informed consent was obtained for the procedure, with all risks and benefits of procedure explained the patient was taken to the endoscopy suite and placed in the left lateral decubitus position. Following sequential administration of sedation as per above, the endoscope was inserted into the mouth and advanced under direct vision to second portion of the duodenum. A careful inspection was made as the gastroscope was withdrawn, including a retroflexed view of the proximal stomach; findings and interventions are described below. Findings:     Esophagus: The esophageal mucosa in the proximal esophagus is normal.  Severe ulcerative/grade D esophagitis is noted starting at 27 cm. Long segment Miranda's compatible mucosa is noted. A large >7 cm hiatal hernia is noted. No Gagandeep's lesions are noted      Stomach: The gastric mucosa has erythema in the body with a few polyps-biopsies taken. .   The fundus was found to be normal with no lesions noted on retroflexion. The angularis is normal as well. Duodenum:   The bulb and post bulbar mucosa is normal in appearance. The duodenal folds are normal.     Therapies:  biopsy of stomach body,polyp    Specimen:  Specimens were collected as described and send to the laboratory. Complications:   None were encountered during the procedure. EBL:  None. Recommendations:     -Acid suppression with a proton pump inhibitor and carafate,   -Await pathology. ,   -Follow symptoms.  -No NSAIDs. -Repeat EGD in 2 months for esophageal biopsies and to confirm healing      Thank you for entrusting me with this patient's care.  Please do not hesitate to contact me with any questions or if I can be of assistance with any of your other patients' GI needs. Frandy Vila MD  8/25/2017  2:14 PM

## 2017-08-25 NOTE — ANESTHESIA PREPROCEDURE EVALUATION
Anesthetic History   No history of anesthetic complications            Review of Systems / Medical History  Patient summary reviewed, nursing notes reviewed and pertinent labs reviewed    Pulmonary          Shortness of breath         Neuro/Psych   Within defined limits           Cardiovascular  Within defined limits                Exercise tolerance: >4 METS     GI/Hepatic/Renal     GERD          Comments: Miranda's Esophagus Endo/Other      Hypothyroidism  Morbid obesity     Other Findings   Comments: Systemic Lupus Erythematosus  Sjogren's Syndrome         Physical Exam    Airway  Mallampati: III  TM Distance: > 6 cm  Neck ROM: normal range of motion   Mouth opening: Normal     Cardiovascular  Regular rate and rhythm,  S1 and S2 normal,  no murmur, click, rub, or gallop             Dental  No notable dental hx       Pulmonary  Breath sounds clear to auscultation               Abdominal  GI exam deferred       Other Findings            Anesthetic Plan    ASA: 3  Anesthesia type: general and total IV anesthesia          Induction: Intravenous  Anesthetic plan and risks discussed with: Patient

## 2017-08-25 NOTE — PROGRESS NOTES
Jerrod Day  1970  [id number]    Situation:  Verbal report received from: Deandra Wallace RN  Procedure: Procedure(s):  COLONOSCOPY  ESOPHAGOGASTRODUODENOSCOPY (EGD)  ESOPHAGOGASTRODUODENAL (EGD) BIOPSY    Background:    Preoperative diagnosis: GERD, IRON DEFICIENCY ANEMIA, COLON POLYPS  Postoperative diagnosis: long segment witt's esophagus; hiatal hernia; dverticulosis    :  Dr. Magalie Ricks  Assistant(s): Endoscopy Technician-1: Santiago Osborn [address] [place] [address]  Endoscopy RN-1: Kimmie Meeks    Specimens:   ID Type Source Tests Collected by Time Destination   1 : gastric biopsy for pathology Preservative Gastric  Eve Leavitt MD 8/25/2017 1416 Pathology     H. Pylori  no    Assessment:  Intra-procedure medications   Anesthesia gave intra-procedure sedation and medications, see anesthesia flow sheet yes    Intravenous fluids: NS@ KVO     Vital signs stable     Abdominal assessment: round and soft     Recommendation:  Discharge patient per MD order.   Family or Friend - Rich,   Permission to share finding with family or friend yes

## 2017-08-25 NOTE — PERIOP NOTES
Endoscope was pre-cleaned at bedside immediately following procedure by [facility], [facility].

## 2017-08-25 NOTE — PROCEDURES
Colonoscopy Procedure Note    Angela Peacock  1970  [id number]        Pre-operative Diagnosis: LOIS DEFICIENCY ANEMIA    Post-operative Diagnosis: internal hemorrhoids, diverticulosis      : Frandy Lucas MD    Referring Provider: Olayinka Marion MD    Sedation:  MAC anesthesia Propofol        Procedure Details:    After detailed informed consent was obtained with all risks and benefits of procedure explained and preoperative exam completed, the patient was taken to the endoscopy suite and placed in the left lateral decubitus position. Upon sequential sedation as per above, a digital rectal exam was performed  And was normal.  The Olympus videocolonoscope  was inserted in the rectum and carefully advanced to the cecum, which was identified by the ileocecal valve. The quality of preparation was fair. The colonoscope was slowly withdrawn with careful evaluation between folds. Retroflexion in the rectum was performed. Findings:   · The patient had desaturations and excessive snoring suggesting sleep apnea. She was restless throughout the procedure and more sedation could not be given as a result. · Mild barotrauma related changes were seen in the right colon and hence insufflation was kept at a minimum. · No large masses are noted. · A few sigmoid diverticula are seen. · Internal hemorrhoids are noted. · Cecum had some stool        Therapies:  none    Specimen:  none       Complications: None were encountered during the procedure. EBL:  None. Recommendations:     -Repeat colonoscopy in 3 years (prep, sedation related issues). -High fiber diet.    -Naturally, for new bleeding, unexplained weight loss,change in bowel habits and anemia, an earlier colonoscopy should be considered. Frandy Lucas MD  8/25/2017  2:26 PM

## 2017-08-25 NOTE — IP AVS SNAPSHOT
[address] [facility] 
[phone] Patient: Vilma Cortez MRN: [id number] RUU:7/5/1934 You are allergic to the following Allergen Reactions Aspirin Anaphylaxis Peanut Anaphylaxis Other Plant, Animal, Environmental Runny Nose Sneezing Hayfever Recent Documentation Height Weight Breastfeeding? BMI OB Status Smoking Status 1.702 m 157.1 kg No 54.23 kg/m2 Hysterectomy Never Smoker Emergency Contacts Name Discharge Info Relation Home Work Mobile Jeremiah Graves DISCHARGE CAREGIVER [3] Spouse [3] [phone] About your hospitalization You were admitted on:  August 25, 2017 You last received care in the:  [facility] ENDOSCOPY You were discharged on:  August 25, 2017 Unit phone number:  [phone] Why you were hospitalized Your primary diagnosis was:  Not on File Providers Seen During Your Hospitalizations Provider Role Specialty Primary office phone Prince Hernández MD Attending Provider Gastroenterology [phone] Your Primary Care Physician (PCP) Primary Care Physician Office Phone Office Fax Priyanka Hampton [phone] Follow-up Information None Your Appointments Tuesday August 29, 2017  4:30 PM EDT  
ESTABLISHED PATIENT with Jordana Huerta MD  
[address]  
[phone] Current Discharge Medication List  
  
START taking these medications Dose & Instructions Dispensing Information Comments Morning Noon Evening Bedtime  
 sucralfate 100 mg/mL suspension Commonly known as:  Jyoti Reyes Your last dose was: Your next dose is:    
   
   
 Dose:  1 g Take 10 mL by mouth four (4) times daily for 14 days. Quantity:  560 mL Refills:  0 STOP taking these medications   
 naproxen sodium 220 mg tablet Commonly known as:  NAPROSYN  
   
  
  
ASK your doctor about these medications Dose & Instructions Dispensing Information Comments Morning Noon Evening Bedtime buPROPion  mg tablet Commonly known as:  Theodor Care Your last dose was: Your next dose is: TAKE 1 TABLET BY MOUTH EVERY MORNING Quantity:  30 Tab Refills:  2  
     
   
   
   
  
 citalopram 40 mg tablet Commonly known as:  Angelina Finder Your last dose was: Your next dose is: TAKE 1 TABLET BY MOUTH EVERY DAY Quantity:  30 Tab Refills:  6 IMURAN 50 mg tablet Generic drug:  azaTHIOprine What changed:  Another medication with the same name was removed. Continue taking this medication, and follow the directions you see here. Your last dose was: Your next dose is:    
   
   
 Dose:  50 mg Take 50 mg by mouth three (3) times daily. Indications: SYSTEMIC LUPUS ERYTHEMATOSUS Refills:  0  
     
   
   
   
  
 levothyroxine 150 mcg tablet Commonly known as:  SYNTHROID Your last dose was: Your next dose is:    
   
   
 Dose:  150 mcg Take 1 Tab by mouth Daily (before breakfast). Needs labs Quantity:  15 Tab Refills:  0  
     
   
   
   
  
 pantoprazole 40 mg tablet Commonly known as:  PROTONIX Your last dose was: Your next dose is:    
   
   
 Dose:  40 mg Take 40 mg by mouth two (2) times a day. Refills:  4 PLAQUENIL 200 mg tablet Generic drug:  hydroxychloroquine Your last dose was: Your next dose is:    
   
   
 Dose:  200 mg Take 200 mg by mouth two (2) times a day. Refills:  0  
     
   
   
   
  
 VITAMIN D3 2,000 unit Tab Generic drug:  cholecalciferol (vitamin D3) Your last dose was: Your next dose is: Take  by mouth. Refills:  0 Where to Get Your Medications Information on where to get these meds will be given to you by the nurse or doctor. ! Ask your nurse or doctor about these medications  
  sucralfate 100 mg/mL suspension Discharge Instructions [place] Office: [phone] April Gama 
[id number] 
1970 EGD/COLONOSCOPY DISCHARGE INSTRUCTIONS Discomfort: 
Sore throat- throat lozenges or warm salt water gargle 
redness at IV site- apply warm compress to area; if redness or soreness persist- contact your physician Gaseous discomfort- walking, belching will help relieve any discomfort You may not operate a vehicle for 12 hours You may not engage in an occupation involving machinery or appliances for rest of today. You may not drink alcoholic beverages for at least 12 hours Avoid making any critical decisions for at least 24 hour DIET You may resume your regular diet  however -  remember your colon is empty and a heavy meal will produce gas. Avoid these foods:  fried / greasy foods, excessive carbonated drinks or too much caffeine MEDICATIONS Regarding Aspirin or Nonsteroidal medications specifically, please see below. ACTIVITY You may resume your normal daily activities. Spend the remainder of the day resting -  avoid any strenuous activity. CALL M.D. ANY SIGN OF Increasing pain, nausea, vomiting Abdominal distension (swelling) New increased bleeding (oral or rectal) Fever (chills) Pain in chest area Bloody discharge from nose or mouth Shortness of breath You may not take any Advil, Aspirin, Ibuprofen, Motrin, Aleve, or Goodys for 7 days, ONLY  Tylenol as needed for pain. Follow-up Instructions: 
 Call  Frandy Zafar MD for any questions or concerns Results of procedure / biopsy in 7 days Telephone # [phone] Follow-up Information None MyChart Activation Thank you for requesting access to [employer]. Please follow the instructions below to securely access and download your online medical record. [employer] allows you to send messages to your doctor, view your test results, renew your prescriptions, schedule appointments, and more. How Do I Sign Up? 1. In your internet browser, go to [email or web address] 
2. Click on the First Time User? Click Here link in the Sign In box. You will be redirect to the New Member Sign Up page. 3. Enter your [employer] Access Code exactly as it appears below. You will not need to use this code after youve completed the sign-up process. If you do not sign up before the expiration date, you must request a new code. [employer] Access Code: Activation code not generated Current [employer] Status: Active (This is the date your [employer] access code will ) 4. Enter the last four digits of your Social Security Number (xxxx) and Date of Birth (mm/dd/yyyy) as indicated and click Submit. You will be taken to the next sign-up page. 5. Create a [employer] ID. This will be your [employer] login ID and cannot be changed, so think of one that is secure and easy to remember. 6. Create a [employer] password. You can change your password at any time. 7. Enter your Password Reset Question and Answer. This can be used at a later time if you forget your password. 8. Enter your e-mail address. You will receive e-mail notification when new information is available in [address] 9. Click Sign Up. You can now view and download portions of your medical record. 10. Click the Download Summary menu link to download a portable copy of your medical information. Additional Information If you have questions, please visit the Frequently Asked Questions section of the [employer] website at [email or web address] Remember, [employer] is NOT to be used for urgent needs. For medical emergencies, dial 911. Discharge Orders None Introducing [facility] & HEALTH SERVICES! Dear Brenda Mondragon: Thank you for requesting a [employer] account. Our records indicate that you already have an active [employer] account. You can access your account anytime at [email or web address] Did you know that you can access your hospital and ER discharge instructions at any time in [employer]? You can also review all of your test results from your hospital stay or ER visit. Additional Information If you have questions, please visit the Frequently Asked Questions section of the [employer] website at [email or web address] Remember, [employer] is NOT to be used for urgent needs. For medical emergencies, dial 911. Now available from your iPhone and Android! General Information Please provide this summary of care documentation to your next provider. Patient Signature:  ____________________________________________________________ Date:  ____________________________________________________________  
  
Mariah Dillard Provider Signature:  ____________________________________________________________ Date:  ____________________________________________________________

## 2017-08-25 NOTE — H&P
Pre-endoscopy H and P    The patient was seen and examined in the room/pre-op holding area. The airway was assessed and documented. The problem list, past medical history, and medications were reviewed. Patient Active Problem List   Diagnosis Code    Systemic lupus erythematosus ([facility] M32.9    Sjogren's syndrome ([facility] M35.00    Obesities, morbid ([facility] E66.01    Hypothyroidism due to acquired atrophy of thyroid E03.4    Shortness of breath R06.02     Social History     Social History    Marital status:      Spouse name: N/A    Number of children: N/A    Years of education: N/A     Occupational History    Not on file. Social History Main Topics    Smoking status: Never Smoker    Smokeless tobacco: Never Used    Alcohol use No    Drug use: No    Sexual activity: Yes     Partners: Male     Other Topics Concern    Not on file     Social History Narrative     Past Medical History:   Diagnosis Date    Anemia     Iron deficiency    Miranda's esophagus     GERD (gastroesophageal reflux disease)     Lupus ([facility]     Sjoegren syndrome ([facility]     Thyroid disease          Prior to Admission Medications   Prescriptions Last Dose Informant Patient Reported? Taking?   azaTHIOprine (IMURAN) 50 mg tablet 8/24/2017 at Unknown time  Yes Yes   Sig: Take 50 mg by mouth three (3) times daily. Indications: SYSTEMIC LUPUS ERYTHEMATOSUS   buPROPion XL (WELLBUTRIN XL) 150 mg tablet 8/24/2017 at Unknown time  No Yes   Sig: TAKE 1 TABLET BY MOUTH EVERY MORNING   cholecalciferol, vitamin D3, (VITAMIN D3) 2,000 unit tab 8/24/2017 at Unknown time  Yes Yes   Sig: Take  by mouth.   citalopram (CELEXA) 40 mg tablet 8/24/2017 at Unknown time  No Yes   Sig: TAKE 1 TABLET BY MOUTH EVERY DAY   hydroxychloroquine (PLAQUENIL) 200 mg tablet 8/24/2017 at Unknown time  Yes Yes   Sig: Take 200 mg by mouth two (2) times a day.    levothyroxine (SYNTHROID) 150 mcg tablet 8/24/2017 at Unknown time  No Yes   Sig: Take 1 Tab by mouth Daily (before breakfast). Needs labs   naproxen sodium (NAPROSYN) 220 mg tablet   Yes No   Sig: Take 220 mg by mouth as needed. pantoprazole (PROTONIX) 40 mg tablet 8/24/2017 at Unknown time  Yes Yes   Sig: Take 40 mg by mouth two (2) times a day. Facility-Administered Medications: None           The review of systems is:  Negative  for shortness of breath or chest pain      The heart, lungs, and mental status were satisfactory for the administration of deep sedation and for the procedure. I discussed with the patient the objectives, risks, consequences and alternatives to the procedure.       Sandy King MD  8/25/2017  2:04 PM

## 2017-08-25 NOTE — ANESTHESIA POSTPROCEDURE EVALUATION
Post-Anesthesia Evaluation and Assessment    Patient: Jake Edwards MRN: [id number]  SSN: [id number]    YOB: 1970  Age: 50 y.o. Sex: female       Cardiovascular Function/Vital Signs  Visit Vitals    /59    Pulse 76    Temp 36.9 °C (98.5 °F)    Resp 14    Ht 5' 7\" (1.702 m)    Wt 157.1 kg (346 lb 4 oz)    SpO2 97%    Breastfeeding No    BMI 54.23 kg/m2       Patient is status post general, total IV anesthesia anesthesia for Procedure(s):  COLONOSCOPY  ESOPHAGOGASTRODUODENOSCOPY (EGD)  ESOPHAGOGASTRODUODENAL (EGD) BIOPSY. Nausea/Vomiting: None    Postoperative hydration reviewed and adequate. Pain:  Pain Scale 1: Numeric (0 - 10) (08/25/17 1343)  Pain Intensity 1: 0 (08/25/17 1343)   Managed    Neurological Status: At baseline    Mental Status and Level of Consciousness: Arousable    Pulmonary Status:   O2 Device: CO2 nasal cannula (08/25/17 1431)   Adequate oxygenation and airway patent    Complications related to anesthesia: None    Post-anesthesia assessment completed.  No concerns    Signed By: Khadijah Alcantar MD     August 25, 2017

## 2017-08-29 ENCOUNTER — OFFICE VISIT (OUTPATIENT)
Dept: RHEUMATOLOGY | Age: 47
End: 2017-08-29

## 2017-08-29 VITALS
HEIGHT: 67 IN | HEART RATE: 68 BPM | SYSTOLIC BLOOD PRESSURE: 123 MMHG | BODY MASS INDEX: 45.99 KG/M2 | WEIGHT: 293 LBS | OXYGEN SATURATION: 98 % | DIASTOLIC BLOOD PRESSURE: 85 MMHG | TEMPERATURE: 98.2 F | RESPIRATION RATE: 18 BRPM

## 2017-08-29 DIAGNOSIS — M32.8 OTHER FORMS OF SYSTEMIC LUPUS ERYTHEMATOSUS (HCC): Primary | ICD-10-CM

## 2017-08-29 NOTE — MR AVS SNAPSHOT
Visit Information Date & Time Provider Department Dept. Phone Encounter #  
 8/29/2017  4:30 PM Pippa Aguirre MD [address] [facility] [id number] Follow-up Instructions Return in about 4 months (around 12/29/2017). Upcoming Health Maintenance Date Due  
 BREAST CANCER SCRN MAMMOGRAM 4/13/2016 INFLUENZA AGE 9 TO ADULT 8/1/2017 PAP AKA CERVICAL CYTOLOGY 3/2/2018 DTaP/Tdap/Td series (2 - Td) 10/20/2024 COLONOSCOPY 8/25/2027 Allergies as of 8/29/2017  Review Complete On: 8/29/2017 By: Sue Zamora LPN Severity Noted Reaction Type Reactions Aspirin High 01/05/2015    Anaphylaxis Peanut High 01/05/2015    Anaphylaxis Other Plant, Animal, Environmental  06/29/2015    Runny Nose, Sneezing Hayfever Current Immunizations  Reviewed on 1/5/2015 Name Date Influenza Vaccine 10/20/2014 MMR 12/1/1989 TB Skin Test (PPD) 12/1/1989 Tdap 10/20/2014 Not reviewed this visit Vitals BP Pulse Temp Resp Height(growth percentile) Weight(growth percentile) 123/85 (BP 1 Location: Right arm, BP Patient Position: Sitting) 68 98.2 °F (36.8 °C) (Oral) 18 5' 7\" (1.702 m) 346 lb (156.9 kg) LMP SpO2 BMI OB Status Smoking Status 12/03/2013 98% 54.19 kg/m2 Hysterectomy Never Smoker Vitals History BMI and BSA Data Body Mass Index Body Surface Area  
 54.19 kg/m 2 2.72 m 2 Preferred Pharmacy Pharmacy Name Phone [facility] [phone] [phone] , [address] [place] [address] [phone] Your Updated Medication List  
  
   
This list is accurate as of: 8/29/17  4:48 PM.  Always use your most recent med list.  
  
  
  
  
 buPROPion  mg tablet Commonly known as:  WELLBUTRIN XL  
TAKE 1 TABLET BY MOUTH EVERY MORNING  
  
 citalopram 40 mg tablet Commonly known as:  Joon Orellana TAKE 1 TABLET BY MOUTH EVERY DAY IMURAN 50 mg tablet Generic drug:  azaTHIOprine Take 50 mg by mouth three (3) times daily. Indications: SYSTEMIC LUPUS ERYTHEMATOSUS  
  
 levothyroxine 150 mcg tablet Commonly known as:  SYNTHROID Take 1 Tab by mouth Daily (before breakfast). Needs labs  
  
 pantoprazole 40 mg tablet Commonly known as:  PROTONIX Take 40 mg by mouth two (2) times a day. PLAQUENIL 200 mg tablet Generic drug:  hydroxychloroquine Take 200 mg by mouth two (2) times a day. sucralfate 100 mg/mL suspension Commonly known as:  Deborah Carrel Take 10 mL by mouth four (4) times daily for 14 days. VITAMIN D3 2,000 unit Tab Generic drug:  cholecalciferol (vitamin D3) Take  by mouth. Follow-up Instructions Return in about 4 months (around 12/29/2017). Introducing [facility] & HEALTH SERVICES! Dear Mary Garcia: Thank you for requesting a [employer] account. Our records indicate that you already have an active [employer] account. You can access your account anytime at [email or web address] Did you know that you can access your hospital and ER discharge instructions at any time in [employer]? You can also review all of your test results from your hospital stay or ER visit. Additional Information If you have questions, please visit the Frequently Asked Questions section of the [email or web address] website at [email or web address] Remember, [email or web address] is NOT to be used for urgent needs. For medical emergencies, dial 911. Now available from your iPhone and Android! Please provide this summary of care documentation to your next provider. Your primary care clinician is listed as Fátima Martin. If you have any questions after today's visit, please call [phone].

## 2017-08-29 NOTE — PROGRESS NOTES
RHEUMATOLOGY PROBLEM LIST AND CHIEF COMPLAINT  1. Lupus (1999) -  fatigue, arthralgia, rash (livedo reticularis), pleurisy, interstitial changes however no interstitial lung disease, proteinuria, fevers, dry eyes, dry mouth and Raynaud's phenomenon, SHREYA, SSA, SSB and double-stranded DNA. Therapy History:  Prior DMARDs: Cellcept (approx. 6 months, stopped because of size of pill)  Current DMARDs: Imuran (5665-4675, restarted 09/2016-current), Hydroxychloroquine (stopped 10/2016, 1/2017-current)    2. Sjogrens syndrome - dry eyes, dry mouth and Raynaud's phenomenon    INTERVAL HISTORY  This is a 50 y.o.  female. Today, the patient complains of pain in the joints. Location: elbows, shoulders  Severity:  5 on a scale of 0-10  Timing: all day   Duration:  3 months  Context/Associated signs and symptoms: The patient states that her recent prednisone has helped some. She complains of occasional dry eyes and mouth. She continues on plaquenil 400 mg daily and Imuran 150 mg daily. There have been no adverse effects to the current medication. She reports taking iron supplement which has helped with her fatigue greatly. She reports a recent endoscopy and mentions esophageal bleeding. She states that is she unable to take NSAIDs as a result. She reports a normal colonoscopy.  S    RHEUMATOLOGY REVIEW OF SYSTEMS   Positives as per history  Negatives as follows:  Germain Titus:  Denies unexplained persistent fevers or weight change  RESPIRATORY:  No pleuritic pain, exertional dyspnea  CARDIOVASCULAR:  Denies chest pain  GASTRO:   Denies heartburn, abdominal pain, nausea, vomiting, diarrhea  SKIN:    Denies rash   MSK:    No morning stiffness >1 hour, persistent joint swelling, persistent joint pain    PAST MEDICAL HISTORY  Reviewed with patient, significant changes in medical history - no    FAMILY HISTORY  autoimmune thyroid disease - mother    PHYSICAL EXAM  Blood pressure 123/85, pulse 68, temperature 98.2 °F (36.8 °C), temperature source Oral, resp. rate 18, height 5' 7\" (1.702 m), weight 346 lb (156.9 kg), last menstrual period 12/03/2013, SpO2 98 %. GENERAL APPEARANCE: Well-nourished, no acute distress  NECK: Tender cervical lymphadenopathy, swelling (decreased)  ENT: No oral ulcers  CARDIOVASCULAR: Heart rhythm is regular. No murmur, rub, gallop  CHEST: Normal vesicular breath sounds. No wheezes, rales, pleural friction rubs, no pleurisy today  ABDOMINAL: The abdomen is soft and nontender. Bowel sounds are normal  SKIN: No rash, palpable purpura, digital ulcer, abnormal thickening   MUSCULOSKELETAL:   Upper extremities - full range of motion, no tenderness, no swelling, no synovial thickening and no deformity of joints  Lower extremities - bony prominence of bilateral knees with no tenderness on range of motion    LABS, RADIOLOGY AND PROCEDURES  Previous labs reviewed -Yes    ASSESSMENT  1. Lupus (Established problem - Stable disease) - The patient's joints seem good on exam. I explained that it is possible her iron deficiency is secondary to anemia from chronic inflammation so I suspect the patient's inflammation may not have been well-controlled in the first place. Since she has upcoming labs, I will wait until those results before changing her treatment. If she requires more prednisone then we will also consider adjusting treatment. For now, she should continue with plaquenil 400 mg daily and Imuran 150 mg daily. She should return in 4 months for a follow up. 2. Sjogren syndrome (Established problem - Stable disease) - the patient's disease is stable. She will use symptomatic treatments for dry mouth. 3. Drug therapy monitoring for toxicity (plaquenil ) - CBC, BUN, Cr, AST, ALT and albumin every 3 months; eye exams every 6-12 months for retinal toxicity. monitor     PLAN  1. Hydroxychloroquine 400 mg daily   2. Imuran 150 mg daily  3. Return in 4 months      Stanislaw Santana MD  Adult and Pediatric Rheumatology     [facility], [address], Phone [phone], Fax [phone]     Visiting  of Pediatrics    Department of Pediatrics, [address], [address] [place] [address], Phone [phone], Fax [phone]    There are no Patient Instructions on file for this visit. cc:  Holley Skiff, MD    Written by zulema Crockett, as dictated by Orly Faria.  Sabrina Guido M.D.

## 2018-05-31 ENCOUNTER — OFFICE VISIT (OUTPATIENT)
Dept: RHEUMATOLOGY | Age: 48
End: 2018-05-31

## 2018-05-31 VITALS
DIASTOLIC BLOOD PRESSURE: 89 MMHG | OXYGEN SATURATION: 98 % | RESPIRATION RATE: 16 BRPM | BODY MASS INDEX: 55.6 KG/M2 | HEART RATE: 81 BPM | WEIGHT: 293 LBS | SYSTOLIC BLOOD PRESSURE: 145 MMHG | TEMPERATURE: 98.1 F

## 2018-05-31 DIAGNOSIS — L93.1 SUBACUTE CUTANEOUS LUPUS ERYTHEMATOSUS: Primary | ICD-10-CM

## 2018-05-31 DIAGNOSIS — D50.8 OTHER IRON DEFICIENCY ANEMIA: ICD-10-CM

## 2018-05-31 RX ORDER — AZATHIOPRINE 50 MG/1
150 TABLET ORAL DAILY
Qty: 270 TAB | Refills: 2 | Status: SHIPPED | OUTPATIENT
Start: 2018-05-31 | End: 2018-06-28 | Stop reason: CLARIF

## 2018-05-31 RX ORDER — LIFITEGRAST 50 MG/ML
SOLUTION/ DROPS OPHTHALMIC
Refills: 3 | COMMUNITY
Start: 2018-05-03 | End: 2019-03-25 | Stop reason: ALTCHOICE

## 2018-05-31 RX ORDER — HYDROXYCHLOROQUINE SULFATE 200 MG/1
400 TABLET, FILM COATED ORAL DAILY
Qty: 180 TAB | Refills: 2 | Status: SHIPPED | OUTPATIENT
Start: 2018-05-31 | End: 2018-06-28 | Stop reason: CLARIF

## 2018-05-31 NOTE — PROGRESS NOTES
RHEUMATOLOGY PROBLEM LIST AND CHIEF COMPLAINT  1. Lupus (1999) -  fatigue, arthralgia, rash (livedo reticularis), pleurisy, interstitial changes however no interstitial lung disease, proteinuria, fevers, dry eyes, dry mouth and Raynaud's phenomenon, SHREYA, SSA, SSB and double-stranded DNA. Therapy History:  Prior DMARDs: Cellcept (approx. 6 months, stopped because of size of pill)  Current DMARDs: Imuran (2821-7117, restarted 09/2016-current), Hydroxychloroquine (stopped 10/2016, 1/2017-current)    2. Sjogrens syndrome - dry eyes, dry mouth and Raynaud's phenomenon    INTERVAL HISTORY  This is a 50 y.o.  female. Today, the patient complains of pain in the joints. Location: hand  Severity:  3 on a scale of 0-10  Timing: all day   Duration:  9 months  Context/Associated signs and symptoms: The patient states she is doing well. She complains of some joint pain that worsens with the weather. She denies any recent episodes of pleurisy. She continues with symptomatic treatment, plaquenil 400 mg daily, and Imuran 150 mg daily. She has no other complaints. We discussed her anemia. RHEUMATOLOGY REVIEW OF SYSTEMS   Positives as per history  Negatives as follows:  Matt [place]:  Denies unexplained persistent fevers or weight change  RESPIRATORY:  No pleuritic pain, exertional dyspnea  CARDIOVASCULAR:  Denies chest pain  GASTRO:   Denies heartburn, abdominal pain, nausea, vomiting, diarrhea  SKIN:    Denies rash   MSK:    No morning stiffness >1 hour, persistent joint swelling, persistent joint pain    PAST MEDICAL HISTORY  Reviewed with patient, significant changes in medical history - no    FAMILY HISTORY  autoimmune thyroid disease - mother    PHYSICAL EXAM  Blood pressure 145/89, pulse 81, temperature 98.1 °F (36.7 °C), temperature source Oral, resp. rate 16, weight (!) 355 lb (161 kg), last menstrual period 12/03/2013, SpO2 98 %.   GENERAL APPEARANCE: Well-nourished, no acute distress  NECK: Tender cervical lymphadenopathy, swelling (decreased)  ENT: No oral ulcers  CARDIOVASCULAR: Heart rhythm is regular. No murmur, rub, gallop  CHEST: Normal vesicular breath sounds. No wheezes, rales, pleural friction rubs  ABDOMINAL: The abdomen is soft and nontender. Bowel sounds are normal  SKIN: No rash, palpable purpura, digital ulcer, abnormal thickening   MUSCULOSKELETAL:   Upper extremities - full range of motion, no tenderness, no swelling, no synovial thickening and no deformity of joints  Lower extremities - bony prominence of bilateral knees with no tenderness on range of motion    LABS, RADIOLOGY AND PROCEDURES  Previous labs reviewed -Yes    ASSESSMENT  1. Lupus (Established problem - Stable disease) - Her disease seems well-managed with Imuran 150 mg daily and Plaquenil 400 mg daily. She should continue with her medications and return in 6 months for a follow up. I will check labs. 2. Sjogren syndrome (Established problem - Stable disease) - the patient's disease is stable. She will use symptomatic treatments for dry mouth. 3. Drug therapy monitoring for toxicity (plaquenil ) - CBC, BUN, Cr, AST, ALT and albumin every 3 months; eye exams every 6-12 months for retinal toxicity. monitor     PLAN  1. Hydroxychloroquine 400 mg daily   2. Imuran 150 mg daily  3. Lupus disease activity labs - CBC, CMP, ESR, CRP, DSDNDA, complements, UA, urine protein/creatinine   4. Iron Profile, TSH  5. Return in 6 months    Stanislaw Cooper MD  Adult and Pediatric Rheumatology     [facility] [address], Phone [phone], Fax [phone]     Visiting  of Pediatrics    Department of Pediatrics[address], Phone [phone], Fax [phone]    There are no Patient Instructions on file for this visit.     cc:  Olayinka Marion MD    Written by Cayla Nguyen zulema, as dictated by Marta Harden.  Sera Hernandez M.D.

## 2018-05-31 NOTE — MR AVS SNAPSHOT
[address] 
[phone] Patient: Ashley Alicea MRN: [id number] JLP:[birth year] Visit Information Date & Time Provider Department Dept. Phone Encounter #  
 5/31/2018  2:20 PM Massimo Gutierrez MD [address] [id number] Follow-up Instructions Return in about 6 months (around 11/30/2018). Upcoming Health Maintenance Date Due  
 BREAST CANCER SCRN MAMMOGRAM 4/13/2016 PAP AKA CERVICAL CYTOLOGY 3/2/2018 Influenza Age 5 to Adult 8/1/2018 DTaP/Tdap/Td series (2 - Td) 10/20/2024 COLONOSCOPY 8/25/2027 Allergies as of 5/31/2018  Review Complete On: 5/31/2018 By: Dipti Mensah LPN Severity Noted Reaction Type Reactions Aspirin High 01/05/2015    Anaphylaxis Peanut High 01/05/2015    Anaphylaxis Other Plant, Animal, Environmental  06/29/2015    Runny Nose, Sneezing Hayfever Current Immunizations  Reviewed on 1/5/2015 Name Date Influenza Vaccine 10/20/2014 MMR 12/1/1989 TB Skin Test (PPD) 12/1/1989 Tdap 10/20/2014 Not reviewed this visit You Were Diagnosed With   
  
 Codes Comments Subacute cutaneous lupus erythematosus    -  Primary ICD-10-CM: L93.1 ICD-9-CM: 695.4 Vitals BP Pulse Temp Resp Weight(growth percentile) LMP  
 145/89 (BP 1 Location: Right arm, BP Patient Position: Sitting) 81 98.1 °F (36.7 °C) (Oral) 16 (!) 355 lb (161 kg) 12/03/2013 SpO2 BMI OB Status Smoking Status 98% 55.6 kg/m2 Hysterectomy Never Smoker BMI and BSA Data Body Mass Index Body Surface Area  
 55.6 kg/m 2 2.76 m 2 Preferred Pharmacy [facility] Name Phone [phone] [facility] [phone], [address] AT [place] [address] [phone] Your Updated Medication List  
  
   
 This list is accurate as of 5/31/18  2:53 PM.  Always use your most recent med list.  
  
  
  
  
 buPROPion  mg tablet Commonly known as:  WELLBUTRIN XL  
TAKE 1 TABLET BY MOUTH EVERY MORNING  
  
 citalopram 40 mg tablet Commonly known as:  CELEXA  
TAKE 1 TABLET BY MOUTH EVERY DAY  
  
 * IMURAN 50 mg tablet Generic drug:  azaTHIOprine Take 50 mg by mouth three (3) times daily. Indications: SYSTEMIC LUPUS ERYTHEMATOSUS  
  
 * azaTHIOprine 50 mg tablet Commonly known as:  The [employer]epsi Take 3 Tabs by mouth daily for 90 days. levothyroxine 150 mcg tablet Commonly known as:  SYNTHROID Take 1 Tab by mouth Daily (before breakfast). Needs labs  
  
 pantoprazole 40 mg tablet Commonly known as:  PROTONIX Take 40 mg by mouth two (2) times a day. * PLAQUENIL 200 mg tablet Generic drug:  hydroxychloroquine Take 200 mg by mouth two (2) times a day. * hydroxychloroquine 200 mg tablet Commonly known as:  PLAQUENIL Take 2 Tabs by mouth daily. VITAMIN D3 2,000 unit Tab Generic drug:  cholecalciferol (vitamin D3) Take  by mouth. XIIDRA 5 % Dpet Generic drug:  lifitegrast  
INSTILL 1 DROP INTO BOTH EYES BID * Notice: This list has 4 medication(s) that are the same as other medications prescribed for you. Read the directions carefully, and ask your doctor or other care provider to review them with you. Prescriptions Sent to Pharmacy Refills  
 hydroxychloroquine (PLAQUENIL) 200 mg tablet 2 Sig: Take 2 Tabs by mouth daily. Class: Normal  
 Pharmacy: [facility] [address] [place] [address] Ph #: [phone] Route: Oral  
 azaTHIOprine (IMURAN) 50 mg tablet 2 Sig: Take 3 Tabs by mouth daily for 90 days.   
 Class: Normal  
 Pharmacy: [facility] [address] Ph #: [phone] Route: Oral  
  
We Performed the Following C REACTIVE PROTEIN, QT [65709 CPT(R)] CBC+PLATELET+HEM REVIEW [81502 CPT(R)] COMPLEMENT, C3 [id number] CPT(R)] COMPLEMENT, C4 [id number] CPT(R)] DSDNA (NDNA) SCRN BY EVELINA [[id number] Custom] IRON PROFILE [id number] CPT(R)] METABOLIC PANEL, COMPREHENSIVE [71790 CPT(R)] SED RATE (ESR) [id number] CPT(R)] TSH 3RD GENERATION [87035 CPT(R)] UA/M W/RFLX CULTURE, ROUTINE [[id number] Custom] Follow-up Instructions Return in about 6 months (around 11/30/2018). Introducing hospitals & HEALTH SERVICES! Dear Thang Hancock: Thank you for requesting a [employer] account. Our records indicate that you already have an active TappnGo account. You can access your account anytime at [email or web address]. [employer] Did you know that you can access your hospital and ER discharge instructions at any time in [employer]? You can also review all of your test results from your hospital stay or ER visit. Additional Information If you have questions, please visit the Frequently Asked Questions section of the TappnGo website at [email or web address] [email or web address]/. Remember, TappnGo is NOT to be used for urgent needs. For medical emergencies, dial 911. Now available from your iPhone and Android! Please provide this summary of care documentation to your next provider. Your primary care clinician is listed as Colleen Velasquez. If you have any questions after today's visit, please call [phone].

## 2018-06-02 LAB
APPEARANCE UR: CLEAR
BACTERIA #/AREA URNS HPF: ABNORMAL /[HPF]
BACTERIA UR CULT: ABNORMAL
BACTERIA UR CULT: ABNORMAL
BILIRUB UR QL STRIP: NEGATIVE
CASTS URNS QL MICRO: ABNORMAL /LPF
COLOR UR: YELLOW
EPI CELLS #/AREA URNS HPF: ABNORMAL /HPF
GLUCOSE UR QL: NEGATIVE
HGB UR QL STRIP: NEGATIVE
KETONES UR QL STRIP: NEGATIVE
LEUKOCYTE ESTERASE UR QL STRIP: ABNORMAL
MICRO URNS: ABNORMAL
MUCOUS THREADS URNS QL MICRO: PRESENT
NITRITE UR QL STRIP: NEGATIVE
PH UR STRIP: 7 [PH] (ref 5–7.5)
PROT UR QL STRIP: NEGATIVE
RBC #/AREA URNS HPF: ABNORMAL /HPF
SP GR UR: 1.01 (ref 1–1.03)
URINALYSIS REFLEX, 377202: ABNORMAL
UROBILINOGEN UR STRIP-MCNC: 0.2 MG/DL (ref 0.2–1)
WBC #/AREA URNS HPF: ABNORMAL /HPF

## 2018-06-05 LAB
ALBUMIN SERPL-MCNC: 4.3 G/DL (ref 3.5–5.5)
ALBUMIN/GLOB SERPL: 1.5 {RATIO} (ref 1.2–2.2)
ALP SERPL-CCNC: 83 IU/L (ref 39–117)
ALT SERPL-CCNC: 17 IU/L (ref 0–32)
AST SERPL-CCNC: 14 IU/L (ref 0–40)
BASOPHILS # BLD MANUAL: 0 X10E3/UL (ref 0–0.2)
BASOPHILS NFR BLD MANUAL: 0 %
BILIRUB SERPL-MCNC: 0.3 MG/DL (ref 0–1.2)
BUN SERPL-MCNC: 11 MG/DL (ref 6–24)
BUN/CREAT SERPL: 14 (ref 9–23)
C3 SERPL-MCNC: 177 MG/DL (ref 82–167)
C4 SERPL-MCNC: 25 MG/DL (ref 14–44)
CALCIUM SERPL-MCNC: 9.5 MG/DL (ref 8.7–10.2)
CHLORIDE SERPL-SCNC: 102 MMOL/L (ref 96–106)
CO2 SERPL-SCNC: 26 MMOL/L (ref 18–29)
CREAT SERPL-MCNC: 0.76 MG/DL (ref 0.57–1)
CRP SERPL-MCNC: 20.3 MG/L (ref 0–4.9)
DIFFERENTIAL COMMENT, 115260: ABNORMAL
DSDNA (NDNA) SCRN BY CRITHIDIA: NORMAL TITER
EOSINOPHIL # BLD MANUAL: 0.1 X10E3/UL (ref 0–0.4)
EOSINOPHIL NFR BLD MANUAL: 1 %
ERYTHROCYTE [DISTWIDTH] IN BLOOD BY AUTOMATED COUNT: 15.6 % (ref 12.3–15.4)
ERYTHROCYTE [SEDIMENTATION RATE] IN BLOOD BY WESTERGREN METHOD: 27 MM/HR (ref 0–32)
GFR SERPLBLD CREATININE-BSD FMLA CKD-EPI: 107 ML/MIN/1.73
GFR SERPLBLD CREATININE-BSD FMLA CKD-EPI: 92 ML/MIN/1.73
GLOBULIN SER CALC-MCNC: 2.9 G/DL (ref 1.5–4.5)
GLUCOSE SERPL-MCNC: 106 MG/DL (ref 65–99)
HCT VFR BLD AUTO: 38.9 % (ref 34–46.6)
HGB BLD-MCNC: 12.6 G/DL (ref 11.1–15.9)
IRON SATN MFR SERPL: 12 % (ref 15–55)
IRON SERPL-MCNC: 37 UG/DL (ref 27–159)
LYMPHOCYTES # BLD MANUAL: 1.4 X10E3/UL (ref 0.7–3.1)
LYMPHOCYTES NFR BLD MANUAL: 18 %
MCH RBC QN AUTO: 26.3 PG (ref 26.6–33)
MCHC RBC AUTO-ENTMCNC: 32.4 G/DL (ref 31.5–35.7)
MCV RBC AUTO: 81 FL (ref 79–97)
MONOCYTES # BLD MANUAL: 0.2 X10E3/UL (ref 0.1–0.9)
MONOCYTES NFR BLD MANUAL: 3 %
NEUTROPHILS # BLD MANUAL: 6.1 X10E3/UL (ref 1.4–7)
NEUTROPHILS NFR BLD MANUAL: 78 %
PLATELET # BLD AUTO: 298 X10E3/UL (ref 150–379)
PLATELET BLD QL SMEAR: ADEQUATE
POTASSIUM SERPL-SCNC: 4.2 MMOL/L (ref 3.5–5.2)
PROT SERPL-MCNC: 7.2 G/DL (ref 6–8.5)
RBC # BLD AUTO: 4.79 X10E6/UL (ref 3.77–5.28)
RBC MORPH BLD: ABNORMAL
SODIUM SERPL-SCNC: 142 MMOL/L (ref 134–144)
TIBC SERPL-MCNC: 314 UG/DL (ref 250–450)
TSH SERPL DL<=0.005 MIU/L-ACNC: 2.93 UIU/ML (ref 0.45–4.5)
UIBC SERPL-MCNC: 277 UG/DL (ref 131–425)
WBC # BLD AUTO: 7.8 X10E3/UL (ref 3.4–10.8)

## 2018-06-19 ENCOUNTER — HOSPITAL ENCOUNTER (EMERGENCY)
Age: 48
Discharge: HOME | End: 2018-06-19
Attending: EMERGENCY MEDICINE
Payer: COMMERCIAL

## 2018-06-19 ENCOUNTER — APPOINTMENT (OUTPATIENT)
Dept: ULTRASOUND IMAGING | Age: 48
End: 2018-06-19
Attending: EMERGENCY MEDICINE
Payer: COMMERCIAL

## 2018-06-19 VITALS
TEMPERATURE: 98 F | SYSTOLIC BLOOD PRESSURE: 146 MMHG | DIASTOLIC BLOOD PRESSURE: 75 MMHG | BODY MASS INDEX: 45.99 KG/M2 | OXYGEN SATURATION: 97 % | HEART RATE: 70 BPM | HEIGHT: 67 IN | WEIGHT: 293 LBS | RESPIRATION RATE: 18 BRPM

## 2018-06-19 DIAGNOSIS — K80.20 CALCULUS OF GALLBLADDER WITHOUT CHOLECYSTITIS WITHOUT OBSTRUCTION: Primary | ICD-10-CM

## 2018-06-19 DIAGNOSIS — K80.50 BILIARY COLIC: ICD-10-CM

## 2018-06-19 LAB
ALBUMIN SERPL-MCNC: 3.2 G/DL (ref 3.5–5)
ALBUMIN/GLOB SERPL: 0.8 {RATIO} (ref 1.1–2.2)
ALP SERPL-CCNC: 78 U/L (ref 45–117)
ALT SERPL-CCNC: 24 U/L (ref 12–78)
ANION GAP SERPL CALC-SCNC: 10 MMOL/L (ref 5–15)
APPEARANCE UR: CLEAR
AST SERPL-CCNC: 11 U/L (ref 15–37)
BACTERIA URNS QL MICRO: ABNORMAL /HPF
BASOPHILS # BLD: 0 K/UL (ref 0–0.1)
BASOPHILS NFR BLD: 0 % (ref 0–1)
BILIRUB SERPL-MCNC: 0.3 MG/DL (ref 0.2–1)
BILIRUB UR QL: NEGATIVE
BUN SERPL-MCNC: 10 MG/DL (ref 6–20)
BUN/CREAT SERPL: 13 (ref 12–20)
CALCIUM SERPL-MCNC: 8.8 MG/DL (ref 8.5–10.1)
CHLORIDE SERPL-SCNC: 108 MMOL/L (ref 97–108)
CO2 SERPL-SCNC: 23 MMOL/L (ref 21–32)
COLOR UR: ABNORMAL
CREAT SERPL-MCNC: 0.75 MG/DL (ref 0.55–1.02)
DIFFERENTIAL METHOD BLD: ABNORMAL
EOSINOPHIL # BLD: 0.2 K/UL (ref 0–0.4)
EOSINOPHIL NFR BLD: 3 % (ref 0–7)
EPITH CASTS URNS QL MICRO: ABNORMAL /LPF
ERYTHROCYTE [DISTWIDTH] IN BLOOD BY AUTOMATED COUNT: 16.9 % (ref 11.5–14.5)
GLOBULIN SER CALC-MCNC: 4.1 G/DL (ref 2–4)
GLUCOSE SERPL-MCNC: 138 MG/DL (ref 65–100)
GLUCOSE UR STRIP.AUTO-MCNC: NEGATIVE MG/DL
HCT VFR BLD AUTO: 38.6 % (ref 35–47)
HGB BLD-MCNC: 12.4 G/DL (ref 11.5–16)
HGB UR QL STRIP: NEGATIVE
IMM GRANULOCYTES # BLD: 0 K/UL (ref 0–0.04)
IMM GRANULOCYTES NFR BLD AUTO: 0 % (ref 0–0.5)
KETONES UR QL STRIP.AUTO: NEGATIVE MG/DL
LEUKOCYTE ESTERASE UR QL STRIP.AUTO: NEGATIVE
LIPASE SERPL-CCNC: 133 U/L (ref 73–393)
LYMPHOCYTES # BLD: 1.3 K/UL (ref 0.8–3.5)
LYMPHOCYTES NFR BLD: 15 % (ref 12–49)
MCH RBC QN AUTO: 27.1 PG (ref 26–34)
MCHC RBC AUTO-ENTMCNC: 32.1 G/DL (ref 30–36.5)
MCV RBC AUTO: 84.3 FL (ref 80–99)
MONOCYTES # BLD: 0.5 K/UL (ref 0–1)
MONOCYTES NFR BLD: 5 % (ref 5–13)
MUCOUS THREADS URNS QL MICRO: ABNORMAL /LPF
NEUTS SEG # BLD: 6.9 K/UL (ref 1.8–8)
NEUTS SEG NFR BLD: 77 % (ref 32–75)
NITRITE UR QL STRIP.AUTO: NEGATIVE
NRBC # BLD: 0 K/UL (ref 0–0.01)
NRBC BLD-RTO: 0 PER 100 WBC
PH UR STRIP: 6.5 [PH] (ref 5–8)
PLATELET # BLD AUTO: 278 K/UL (ref 150–400)
PMV BLD AUTO: 11 FL (ref 8.9–12.9)
POTASSIUM SERPL-SCNC: 3.8 MMOL/L (ref 3.5–5.1)
PROT SERPL-MCNC: 7.3 G/DL (ref 6.4–8.2)
PROT UR STRIP-MCNC: NEGATIVE MG/DL
RBC # BLD AUTO: 4.58 M/UL (ref 3.8–5.2)
RBC #/AREA URNS HPF: ABNORMAL /HPF (ref 0–5)
SODIUM SERPL-SCNC: 141 MMOL/L (ref 136–145)
SP GR UR REFRACTOMETRY: 1.02 (ref 1–1.03)
UA: UC IF INDICATED,UAUC: ABNORMAL
UROBILINOGEN UR QL STRIP.AUTO: 1 EU/DL (ref 0.2–1)
WBC # BLD AUTO: 8.9 K/UL (ref 3.6–11)
WBC URNS QL MICRO: ABNORMAL /HPF (ref 0–4)

## 2018-06-19 PROCEDURE — 85025 COMPLETE CBC W/AUTO DIFF WBC: CPT | Performed by: EMERGENCY MEDICINE

## 2018-06-19 PROCEDURE — 87086 URINE CULTURE/COLONY COUNT: CPT | Performed by: EMERGENCY MEDICINE

## 2018-06-19 PROCEDURE — 81001 URINALYSIS AUTO W/SCOPE: CPT | Performed by: EMERGENCY MEDICINE

## 2018-06-19 PROCEDURE — 76705 ECHO EXAM OF ABDOMEN: CPT

## 2018-06-19 PROCEDURE — 96374 THER/PROPH/DIAG INJ IV PUSH: CPT

## 2018-06-19 PROCEDURE — 99283 EMERGENCY DEPT VISIT LOW MDM: CPT

## 2018-06-19 PROCEDURE — 83690 ASSAY OF LIPASE: CPT | Performed by: EMERGENCY MEDICINE

## 2018-06-19 PROCEDURE — 87147 CULTURE TYPE IMMUNOLOGIC: CPT | Performed by: EMERGENCY MEDICINE

## 2018-06-19 PROCEDURE — 80053 COMPREHEN METABOLIC PANEL: CPT | Performed by: EMERGENCY MEDICINE

## 2018-06-19 PROCEDURE — 94762 N-INVAS EAR/PLS OXIMTRY CONT: CPT

## 2018-06-19 PROCEDURE — 74011250636 HC RX REV CODE- 250/636: Performed by: EMERGENCY MEDICINE

## 2018-06-19 PROCEDURE — 36415 COLL VENOUS BLD VENIPUNCTURE: CPT | Performed by: EMERGENCY MEDICINE

## 2018-06-19 PROCEDURE — 96375 TX/PRO/DX INJ NEW DRUG ADDON: CPT

## 2018-06-19 RX ORDER — ONDANSETRON 4 MG/1
4 TABLET, ORALLY DISINTEGRATING ORAL
Qty: 10 TAB | Refills: 0 | Status: SHIPPED | OUTPATIENT
Start: 2018-06-19 | End: 2019-03-25 | Stop reason: ALTCHOICE

## 2018-06-19 RX ORDER — ONDANSETRON 2 MG/ML
4 INJECTION INTRAMUSCULAR; INTRAVENOUS
Status: COMPLETED | OUTPATIENT
Start: 2018-06-19 | End: 2018-06-19

## 2018-06-19 RX ORDER — FENTANYL CITRATE 50 UG/ML
50 INJECTION, SOLUTION INTRAMUSCULAR; INTRAVENOUS
Status: COMPLETED | OUTPATIENT
Start: 2018-06-19 | End: 2018-06-19

## 2018-06-19 RX ORDER — HYDROCODONE BITARTRATE AND ACETAMINOPHEN 5; 325 MG/1; MG/1
1 TABLET ORAL
Qty: 20 TAB | Refills: 0 | Status: SHIPPED | OUTPATIENT
Start: 2018-06-19 | End: 2019-03-25 | Stop reason: ALTCHOICE

## 2018-06-19 RX ORDER — SODIUM CHLORIDE 0.9 % (FLUSH) 0.9 %
5-10 SYRINGE (ML) INJECTION AS NEEDED
Status: DISCONTINUED | OUTPATIENT
Start: 2018-06-19 | End: 2018-06-19 | Stop reason: HOSPADM

## 2018-06-19 RX ORDER — SODIUM CHLORIDE 0.9 % (FLUSH) 0.9 %
5-10 SYRINGE (ML) INJECTION EVERY 8 HOURS
Status: DISCONTINUED | OUTPATIENT
Start: 2018-06-19 | End: 2018-06-19 | Stop reason: HOSPADM

## 2018-06-19 RX ADMIN — FENTANYL CITRATE 50 MCG: 50 INJECTION, SOLUTION INTRAMUSCULAR; INTRAVENOUS at 02:50

## 2018-06-19 RX ADMIN — ONDANSETRON 4 MG: 2 INJECTION, SOLUTION INTRAMUSCULAR; INTRAVENOUS at 02:50

## 2018-06-19 NOTE — DISCHARGE INSTRUCTIONS
Low-Fat Diet for Gallbladder Disease: Care Instructions  Your Care Instructions    When you eat, the gallbladder releases bile, which helps you digest the fat in food. If you have an inflamed gallbladder, this may cause pain. A low-fat diet may give your gallbladder a rest so you can start to heal. Your doctor and dietitian can help you make an eating plan that does not irritate your digestive system. Always talk with your doctor or dietitian before you make changes in your diet. Follow-up care is a key part of your treatment and safety. Be sure to make and go to all appointments, and call your doctor if you are having problems. It's also a good idea to know your test results and keep a list of the medicines you take. How can you care for yourself at home? · Eat many small meals and snacks each day instead of three large meals. · Choose lean meats. ¨ Eat no more than 5 to 6½ ounces of meat a day. ¨ Cut off all fat you can see. ¨ Eat chicken and turkey without the skin. ¨ Many types of fish, such as salmon, lake trout, tuna, and herring, provide healthy omega-3 fat. But, avoid fish canned in oil, such as sardines in olive oil. ¨ Bake, broil, or grill meats, poultry, or fish instead of frying them in butter or fat. · Drink or eat nonfat or low-fat milk, yogurt, cheese, or other milk products each day. ¨ Read the labels on cheeses, and choose those with less than 5 grams of fat an ounce. ¨ Try fat-free sour cream, cream cheese, or yogurt. ¨ Avoid cream soups and cream sauces on pasta. ¨ Eat low-fat ice cream, frozen yogurt, or sorbet. Avoid regular ice cream.  · Eat whole-grain cereals, breads, crackers, rice, or pasta. Avoid high-fat foods such as croissants, scones, biscuits, waffles, doughnuts, muffins, granola, and high-fat breads. · Flavor your foods with herbs and spices (such as basil, tarragon, or mint), fat-free sauces, or lemon juice instead of butter.  You can also use butter substitutes, fat-free mayonnaise, or fat-free dressing. · Try applesauce, prune puree, or mashed bananas to replace some or all of the fat when you bake. · Limit fats and oils, such as butter, margarine, mayonnaise, and salad dressing, to no more than 1 tablespoon a meal.  · Avoid high-fat foods, such as:  ¨ Chocolate, whole milk, ice cream, and processed cheese. ¨ Fried or buttered foods. ¨ Sausage, salami, and chapin. ¨ Cinnamon rolls, cakes, pies, cookies, and other pastries. ¨ Prepared snack foods, such as potato chips, nut and granola bars, and mixed nuts. ¨ Coconut and avocado. · Learn how to read food labels for serving sizes and ingredients. Fast-food and convenience-food meals often have lots of fat. Where can you learn more? Go to http://[employer] Product[employer].info/. Enter K052 in the search box to learn more about \"Low-Fat Diet for Gallbladder Disease: Care Instructions. \"  Current as of: May 12, 2017  Content Version: 11.4  © 9760-6233 [employer]. Care instructions adapted under license by [employer] (which disclaims liability or warranty for this information). If you have questions about a medical condition or this instruction, always ask your healthcare professional. [place] [address] any warranty or liability for your use of this information. Biliary Colic: Care Instructions  Your Care Instructions    Biliary (say \"BILL-ee-air-ee\") colic is belly pain caused by gallbladder problems. It is usually caused by a gallstone moving through or blocking the common bile duct or cystic duct. Gallstones are stones that form in the gallbladder. They are made of cholesterol and other substances. The gallbladder is a small sac located just under the liver. It stores bile released by the liver. Bile helps you digest fats. Gallstones also can form in the common bile duct or cystic duct.  These ducts carry bile from the gallbladder and the liver to the small intestine. Gallstones may be as small as a grain of sand or as large as a golf ball. Gallstones that cause severe symptoms usually are treated with surgery to remove the gallbladder. If the first attack of biliary colic is mild, it is often safe to wait until you have had another attack before you think about having surgery. The doctor has checked you carefully, but problems can develop later. If you notice any problems or new symptoms, get medical treatment right away. Follow-up care is a key part of your treatment and safety. Be sure to make and go to all appointments, and call your doctor if you are having problems. It's also a good idea to know your test results and keep a list of the medicines you take. How can you care for yourself at home? · Take pain medicines exactly as directed. ¨ If the doctor gave you a prescription medicine for pain, take it as prescribed. ¨ If you are not taking a prescription pain medicine, ask your doctor if you can take an over-the-counter medicine. Read and follow all instructions on the label. · Avoid foods that cause symptoms, especially fatty foods. These can cause biliary colic. · You may need more tests to look at your gallbladder. When should you call for help? Call your doctor now or seek immediate medical care if:  ? · You have a fever. ? · You have new belly pain, or your pain gets worse. ? · There is a new or increasing yellow tint to your skin or the whites of your eyes. ? · Your urine is dark yellow-brown, or your stools are light-colored or white. ? · You cannot keep down fluids. ? Watch closely for changes in your health, and be sure to contact your doctor if:  ? · You do not get better as expected. ? · You are not getting better after 1 day (24 hours). Where can you learn more? Go to http://justine-lizz.info/. Enter F094 in the search box to learn more about \"Biliary Colic: Care Instructions. \"  Current as of:  May 12, 2017  Content Version: 11.4  © 8451-6538 [employer]. Care instructions adapted under license by [employer] (which disclaims liability or warranty for this information). If you have questions about a medical condition or this instruction, always ask your healthcare professional. [address] any warranty or liability for your use of this information.

## 2018-06-19 NOTE — ED NOTES
Bedside and Verbal shift change report given to [place] Elders, RN (oncoming nurse) by Samia Ireland RN (offgoing nurse). Report included the following information SBAR, Kardex, ED Summary, [facility] and Recent Results.

## 2018-06-19 NOTE — ED PROVIDER NOTES
EMERGENCY DEPARTMENT HISTORY AND PHYSICAL EXAM          Date: 2018  Patient Name: Demetrice Velázquez    History of Presenting Illness     Chief Complaint   Patient presents with    Abdominal Pain     started thrus night    Back Pain     started thrus night, mid back pain       History Provided By: Patient    HPI: Demetrice Velázquez is a 50 y.o. female, pmhx witt's esophagus / GERD / lupus, who presents ambulatory to the ED c/o progressively worsening diffuse periumbilical abd pain that radiates around her R flank and into her back over the last 3 days. Pt reports an additional single episode of nausea and vomiting. She states her pain was exacerbated after eating dinner yesterday evening and notes it is more bothersome with deep inspiration. She denies any recent medications for her current sxs. Pt denies any recent follow up with her PCP or GI specialist. She otherwise specifically denies any recent fever, chills, diarrhea, CP, SOB, lightheadedness, dizziness, numbness, weakness, tingling, BLE swelling, HA, heart palpitations, urinary sxs, changes in BM, changes in PO intake, melena, hematochezia, cough, or congestion. PCP: Isai Spears MD  GI: Inés Bowles    PMHx: Significant for witt's esophagus, GERD, lupus, Sjogren syndrome, anemia  PSHx: Significant for EGD, hysterectomy, colonoscopy,   Social Hx: -tobacco, -EtOH, -Illicit Drugs    There are no other complaints, changes, or physical findings at this time. Current Facility-Administered Medications   Medication Dose Route Frequency Provider Last Rate Last Dose    sodium chloride (NS) flush 5-10 mL  5-10 mL IntraVENous Q8H Charlotte Mancilla MD        sodium chloride (NS) flush 5-10 mL  5-10 mL IntraVENous PRN Charlotte Mancilla MD         Current Outpatient Prescriptions   Medication Sig Dispense Refill    ondansetron (ZOFRAN ODT) 4 mg disintegrating tablet Take 1 Tab by mouth every eight (8) hours as needed for Nausea.  10 Tab 0    HYDROcodone-acetaminophen (NORCO) 5-325 mg per tablet Take 1 Tab by mouth every four (4) hours as needed for Pain. Max Daily Amount: 6 Tabs. 20 Tab 0    XIIDRA 5 % dpet INSTILL 1 DROP INTO BOTH EYES BID  3    hydroxychloroquine (PLAQUENIL) 200 mg tablet Take 2 Tabs by mouth daily. 180 Tab 2    azaTHIOprine (IMURAN) 50 mg tablet Take 3 Tabs by mouth daily for 90 days. 270 Tab 2    hydroxychloroquine (PLAQUENIL) 200 mg tablet Take 200 mg by mouth two (2) times a day.  azaTHIOprine (IMURAN) 50 mg tablet Take 50 mg by mouth three (3) times daily. Indications: SYSTEMIC LUPUS ERYTHEMATOSUS      citalopram (CELEXA) 40 mg tablet TAKE 1 TABLET BY MOUTH EVERY DAY 30 Tab 6    pantoprazole (PROTONIX) 40 mg tablet Take 40 mg by mouth two (2) times a day. 4    buPROPion XL (WELLBUTRIN XL) 150 mg tablet TAKE 1 TABLET BY MOUTH EVERY MORNING 30 Tab 2    levothyroxine (SYNTHROID) 150 mcg tablet Take 1 Tab by mouth Daily (before breakfast). Needs labs (Patient taking differently: Take 175 mcg by mouth Daily (before breakfast). Needs labs) 15 Tab 0    cholecalciferol, vitamin D3, (VITAMIN D3) 2,000 unit tab Take  by mouth. Past History     Past Medical History:  Past Medical History:   Diagnosis Date    Anemia     Iron deficiency    Miranda's esophagus     GERD (gastroesophageal reflux disease)     Lupus     Sjoegren syndrome ([facility] [address]     Thyroid disease        Past Surgical History:  Past Surgical History:   Procedure Laterality Date    COLONOSCOPY N/A 8/25/2017    COLON[facility]COPY performed by Kimberly [facility]cock MD at [facility] ENDOSCOPY    [facility] END[facility]COPY      HX HYSTERECTOMY  Dec 2013    bleeding       Family History:  Family History   Problem Relation Age of Onset    Diabetes Father     Heart Disease Father     Hypertension Father     Stroke Father     No Known Problems Mother     Hypertension Brother     Cancer Maternal Uncle      esophageal cancer.        Social History:  Social History   Substance Use Topics    Smoking status: Never Smoker    Smokeless tobacco: Never Used    Alcohol use No       Allergies: Allergies   Allergen Reactions    Aspirin Anaphylaxis    Peanut Anaphylaxis    Other Plant, Animal, Environmental Runny Nose and Sneezing     Hayfever           Review of Systems   Review of Systems   Constitutional: Negative for chills and fever. HENT: Negative for congestion, ear pain, rhinorrhea and sore throat. Respiratory: Negative for cough and shortness of breath. Cardiovascular: Negative for chest pain, palpitations and leg swelling. Gastrointestinal: Positive for abdominal pain, nausea and vomiting. Negative for constipation and diarrhea. No melena  No hematochezia   Endocrine: Negative for polyuria. Genitourinary: Negative for dysuria, frequency and hematuria. Neurological: Negative for dizziness, weakness, light-headedness, numbness and headaches. No tingling   All other systems reviewed and are negative. Physical Exam   Physical Exam   Nursing note and vitals reviewed.     General appearance - morbidly obese, well appearing, and in no distress  Eyes - pupils equal and reactive, extraocular eye movements intact  ENT - mucous membranes moist, pharynx normal without lesions  Neck - supple, no significant adenopathy; non-tender to palpation  Chest - clear to auscultation, no wheezes, rales or rhonchi; non-tender to palpation  Heart - normal rate and regular rhythm, S1 and S2 normal, no murmurs noted  Abdomen - soft, tenderness to palpation over the R flank, RUQ, and epigastric area, nondistended, no masses or organomegaly  Musculoskeletal - no joint tenderness, deformity or swelling; normal ROM  Extremities - peripheral pulses normal, no pedal edema  Skin - normal coloration and turgor, no rashes  Neurological - alert, oriented x3, normal speech, no focal findings or movement disorder noted  Written by NICHO Gurrola, as dictated by Norberto Mae MD      Diagnostic Study Results     Labs -     Recent Results (from the past 12 hour(s))   CBC WITH AUTOMATED DIFF    Collection Time: 06/19/18  2:41 AM   Result Value Ref Range    WBC 8.9 3.6 - 11.0 K/uL    RBC 4.58 3.80 - 5.20 M/uL    HGB 12.4 11.5 - 16.0 g/dL    HCT 38.6 35.0 - 47.0 %    MCV 84.3 80.0 - 99.0 FL    MCH 27.1 26.0 - 34.0 PG    MCHC 32.1 30.0 - 36.5 g/dL    RDW 16.9 (H) 11.5 - 14.5 %    PLATELET 153 837 - 917 K/uL    MPV 11.0 8.9 - 12.9 FL    NRBC 0.0 0  WBC    ABSOLUTE NRBC 0.00 0.00 - 0.01 K/uL    NEUTROPHILS 77 (H) 32 - 75 %    LYMPHOCYTES 15 12 - 49 %    MONOCYTES 5 5 - 13 %    EOSINOPHILS 3 0 - 7 %    BASOPHILS 0 0 - 1 %    IMMATURE GRANULOCYTES 0 0.0 - 0.5 %    ABS. NEUTROPHILS 6.9 1.8 - 8.0 K/UL    ABS. LYMPHOCYTES 1.3 0.8 - 3.5 K/UL    ABS. MONOCYTES 0.5 0.0 - 1.0 K/UL    ABS. EOSINOPHILS 0.2 0.0 - 0.4 K/UL    ABS. BASOPHILS 0.0 0.0 - 0.1 K/UL    ABS. IMM. GRANS. 0.0 0.00 - 0.04 K/UL    DF AUTOMATED     METABOLIC PANEL, COMPREHENSIVE    Collection Time: 06/19/18  2:41 AM   Result Value Ref Range    Sodium 141 136 - 145 mmol/L    Potassium 3.8 3.5 - 5.1 mmol/L    Chloride 108 97 - 108 mmol/L    CO2 23 21 - 32 mmol/L    Anion gap 10 5 - 15 mmol/L    Glucose 138 (H) 65 - 100 mg/dL    BUN 10 6 - 20 MG/DL    Creatinine 0.75 0.55 - 1.02 MG/DL    BUN/Creatinine ratio 13 12 - 20      GFR est AA >60 >60 ml/min/1.73m2    GFR est non-AA >60 >60 ml/min/1.73m2    Calcium 8.8 8.5 - 10.1 MG/DL    Bilirubin, total 0.3 0.2 - 1.0 MG/DL    ALT (SGPT) 24 12 - 78 U/L    AST (SGOT) 11 (L) 15 - 37 U/L    Alk.  phosphatase 78 45 - 117 U/L    Protein, total 7.3 6.4 - 8.2 g/dL    Albumin 3.2 (L) 3.5 - 5.0 g/dL    Globulin 4.1 (H) 2.0 - 4.0 g/dL    A-G Ratio 0.8 (L) 1.1 - 2.2     LIPASE    Collection Time: 06/19/18  2:41 AM   Result Value Ref Range    Lipase 133 73 - 393 U/L   URINALYSIS W/ REFLEX CULTURE    Collection Time: 06/19/18  2:41 AM   Result Value Ref Range    Color YELLOW/STRAW      Appearance CLEAR CLEAR      Specific gravity 1.025 1.003 - 1.030      pH (UA) 6.5 5.0 - 8.0      Protein NEGATIVE  NEG mg/dL    Glucose NEGATIVE  NEG mg/dL    Ketone NEGATIVE  NEG mg/dL    Bilirubin NEGATIVE  NEG      Blood NEGATIVE  NEG      Urobilinogen 1.0 0.2 - 1.0 EU/dL    Nitrites NEGATIVE  NEG      Leukocyte Esterase NEGATIVE  NEG      WBC 0-4 0 - 4 /hpf    RBC 0-5 0 - 5 /hpf    Epithelial cells FEW FEW /lpf    Bacteria 1+ (A) NEG /hpf    UA:UC IF INDICATED URINE CULTURE ORDERED (A) CNI      Mucus 1+ (A) NEG /lpf       Radiologic Studies -     US ABD LTD     Clinical indication: Right upper quadrant pain.     Right upper quadrant ultrasound performed. Stones are seen in a nondistended  gallbladder, there is however pericholecystic fluid and tenderness on the real  time. The common bile duct is 3.2 mm, liver is echogenic, portal vein flow is  hepatopedal. Visualized portion of pancreatic head appear unremarkable. The  right kidney appears normal.     IMPRESSION   impression: Cholelithiasis, pericholecystic fluid and tenderness. Medical Decision Making   I am the first provider for this patient. I reviewed the vital signs, available nursing notes, past medical history, past surgical history, family history and social history. Vital Signs-Reviewed the patient's vital signs. Patient Vitals for the past 12 hrs:   Temp Pulse Resp BP SpO2   06/19/18 0145 98 °F (36.7 °C) 81 18 (!) 153/94 100 %       Pulse Oximetry Analysis - 99% on RA    Cardiac Monitor:   Rate: 72bpm  Rhythm: Normal Sinus Rhythm   BP: 183/125    Records Reviewed: Nursing Notes and Old Medical Records    Provider Notes (Medical Decision Making):     DDx: kidney stone, pyelonephritis, UTI, cholecystitis, biliary colic, gastritis, pancreatitis    ED Course:   Initial assessment performed. The patients presenting problems have been discussed, and they are in agreement with the care plan formulated and outlined with them. I have encouraged them to ask questions as they arise throughout their visit. PROGRESS NOTE:  2:18 AM  Repeat /125. Pt denies any hx of HTN. She denies any recent HA, CP, SOB, dizziness, or changes in vision. Will continue to monitor. Written by Surekha Conroy ED Scribe, as dictated by Charlotte Mancilla MD      HYPERTENSION COUNSELING:  Patient denies any current chest pain, headache, shortness of breath, lightheadedness, dizziness, or changes in vision. Patient is made aware of their elevated blood pressure and is instructed to follow up this week with their Primary Care for a recheck. Patient is counseled regarding consequences of chronic, uncontrolled hypertension including kidney disease, heart disease, stroke or even death. Patient verbally states their understanding. Progress note:  4:26 AM  Pt noted to be feeling better, BP improved. Pt states her pain has improved throughout her ED stay, ready for discharge. Updated pt and/or family on all final lab and imaging findings. Pt advised to return to the ED should she develop worsening pain, fever, vomiting, and / or jaundice. Will follow up as instructed. All questions have been answered, pt voiced understanding and agreement with plan. Narcotics were prescribed, pt was advised not to drive or operate heavy machinery. Specific return precautions provided as well as instructions to return to the ED should sx worsen at any time. Vital signs stable for discharge. Written by Surekha Conroy ED Scribe, as dictated by Charlotte Mancilla MD    Diagnosis     Clinical Impression:   1. Calculus of gallbladder without cholecystitis without obstruction    2. Biliary colic        PLAN:  1. Current Discharge Medication List      START taking these medications    Details   ondansetron (ZOFRAN ODT) 4 mg disintegrating tablet Take 1 Tab by mouth every eight (8) hours as needed for Nausea.   Qty: 10 Tab, Refills: 0      HYDROcodone-acetaminophen ([place]) 5-325 mg per tablet Take 1 Tab by mouth every four (4) hours as needed for Pain. Max Daily Amount: 6 Tabs. Qty: 20 Tab, Refills: 0    Associated Diagnoses: Calculus of gallbladder without cholecystitis without obstruction           2. Follow-up Information     Follow up With Details Comments Contact Info    [facility] EMERGENCY DEPT  If symptoms worsen [address] [facility]  [address]   [phone] [facility] [address] Hebert Carlson MD Schedule an appointment as soon as possible for a visit  [address] [facility]  [address]  [phone]          Return to ED if worse     Disposition:    DISCHARGE NOTE:  4:27 AM  The patient's results have been reviewed with family and/or caregiver. They verbally convey their understanding and agreement of the patient's signs, symptoms, diagnosis, treatment, and prognosis. They additionally agree to follow up as recommended in the discharge instructions or to return to the Emergency Room should the patient's condition change prior to their follow-up appointment. The family and/or caregiver verbally agrees with the care-plan and all of their questions have been answered. The discharge instructions have also been provided to the them along with educational information regarding the patient's diagnosis and a list of reasons why the patient would want to return to the ER prior to their follow-up appointment should their condition change. Written by Coretta Ramirez, ED Scribe, as dictated by Dave Gross MD.             Attestations: This note is prepared by Coretta Ramirez, acting as Scribe for MD Charlotte Calhoun MD : The scribe's documentation has been prepared under my direction and personally reviewed by me in its entirety. I confirm that the note above accurately reflects all work, treatment, procedures, and medical decision making performed by me. This note will not be viewable in [address]

## 2018-06-19 NOTE — ED NOTES
Pt discharged by MD Mancilla. Pt provided with discharge instructions Rx and instructions on follow up care. Pt out of ED in stable condition accompanied by self.

## 2018-06-20 LAB
BACTERIA SPEC CULT: ABNORMAL
CC UR VC: ABNORMAL
SERVICE CMNT-IMP: ABNORMAL

## 2018-06-22 ENCOUNTER — OFFICE VISIT (OUTPATIENT)
Dept: SURGERY | Age: 48
End: 2018-06-22

## 2018-06-22 VITALS
BODY MASS INDEX: 45.99 KG/M2 | HEART RATE: 71 BPM | WEIGHT: 293 LBS | HEIGHT: 67 IN | OXYGEN SATURATION: 100 % | RESPIRATION RATE: 20 BRPM | DIASTOLIC BLOOD PRESSURE: 84 MMHG | SYSTOLIC BLOOD PRESSURE: 127 MMHG

## 2018-06-22 DIAGNOSIS — Z01.818 PREOP TESTING: ICD-10-CM

## 2018-06-22 DIAGNOSIS — K80.20 GALLSTONES: Primary | ICD-10-CM

## 2018-06-22 RX ORDER — OMEPRAZOLE 40 MG/1
40 CAPSULE, DELAYED RELEASE ORAL 2 TIMES DAILY
COMMUNITY

## 2018-06-22 NOTE — PROGRESS NOTES
Surgery Consult:  gallstones  Requesting physician:  ER    Subjective:   Patient 50 y.o.  female was seen in ER on 6/19/18 with an acute onset of severe achy epigastric pain radiating to the RUQ and back with nausea and vomiting. Patient has had similar symptoms on and off for about 1 year. Usually occurs 1-2 hours after eating fatty foods. No change in bowel habits. No diarrhea or constipation. No F/C/S. No history of jaundice or pancreatitis. Patient underwent US in the ER which showed cholelithiasis with pericholecystic fluid and tenderness. Patient had no leukocytosis and LFTs were unremarkable. Patient currently denies any abdominal pain. Past Medical & Surgical History:  Past Medical History:   Diagnosis Date    Anemia     Iron deficiency    Anxiety     Miranda's esophagus     GERD (gastroesophageal reflux disease)     Lupus     [facility]joegren syndrome ([facility] [address]     Thyroid disease       Past Surgical History:   Procedure Laterality Date    COLONOSCOPY N/A 8/25/2017    COLON[facility] performed by Simone Mont[facility] MD at [facility] ENDOSCOPY    HX [id number]    HX ENDOSCOPY      HX HYSTERECTOMY  Dec 2013    bleeding       Social History:  Social History     Social History    Marital status:      Spouse name: N/A    Number of children: N/A    Years of education: N/A     Occupational History    Not on file. Social History Main Topics    Smoking status: Never Smoker    Smokeless tobacco: Never Used    Alcohol use Yes      Comment: rarely    Drug use: No    Sexual activity: Yes     Partners: Male     Other Topics Concern    Not on file     Social History Narrative        Family History:  Family History   Problem Relation Age of Onset    Diabetes Father     Heart Disease Father     Hypertension Father     Stroke Father     No Known Problems Mother     Hypertension Brother     Cancer Maternal Uncle      esophageal cancer.     Cancer Paternal Grandfather      lung Medications:  Current Outpatient Prescriptions   Medication Sig    omeprazole (PRILOSEC) 40 mg capsule Take 40 mg by mouth two (2) times a day.  XIIDRA 5 % dpet INSTILL 1 DROP INTO BOTH EYES BID    hydroxychloroquine (PLAQUENIL) 200 mg tablet Take 2 Tabs by mouth daily.  azaTHIOprine (IMURAN) 50 mg tablet Take 3 Tabs by mouth daily for 90 days.  hydroxychloroquine (PLAQUENIL) 200 mg tablet Take 200 mg by mouth two (2) times a day.  azaTHIOprine (IMURAN) 50 mg tablet Take 50 mg by mouth three (3) times daily. Indications: SYSTEMIC LUPUS ERYTHEMATOSUS    citalopram (CELEXA) 40 mg tablet TAKE 1 TABLET BY MOUTH EVERY DAY    buPROPion XL (WELLBUTRIN XL) 150 mg tablet TAKE 1 TABLET BY MOUTH EVERY MORNING    levothyroxine (SYNTHROID) 150 mcg tablet Take 1 Tab by mouth Daily (before breakfast). Needs labs (Patient taking differently: Take 175 mcg by mouth Daily (before breakfast). Needs labs)    ondansetron (ZOFRAN ODT) 4 mg disintegrating tablet Take 1 Tab by mouth every eight (8) hours as needed for Nausea.  HYDROcodone-acetaminophen (NORCO) 5-325 mg per tablet Take 1 Tab by mouth every four (4) hours as needed for Pain. Max Daily Amount: 6 Tabs.  pantoprazole (PROTONIX) 40 mg tablet Take 40 mg by mouth two (2) times a day.  cholecalciferol, vitamin D3, (VITAMIN D3) 2,000 unit tab Take  by mouth. No current facility-administered medications for this visit. Allergies: Allergies   Allergen Reactions    Aspirin Anaphylaxis    Peanut Anaphylaxis    Other Plant, Animal, Environmental Runny Nose and Sneezing     Hayfever         Review of Systems  A comprehensive review of systems was negative except for that written in the HPI.     Objective:     Exam:    Visit Vitals    /84 (BP 1 Location: Right arm, BP Patient Position: Sitting)    Pulse 71    Resp 20    Ht 5' 7\" (1.702 m)    Wt (!) 160.4 kg (353 lb 9.6 oz)    LMP 12/03/2013    SpO2 100%    BMI 55.38 kg/m2 General appearance: alert, cooperative, no distress, appears stated age  Eyes: no sclera icterus  Lungs: clear to auscultation bilaterally  Heart: regular rate and rhythm  Abdomen: soft, non-tender. Non-distended. Extremities: extremities normal, atraumatic, no cyanosis or edema. [place]. Skin: Skin color, texture, turgor normal. No rashes or lesions. No jaundice. Neurologic: Grossly normal    Assessment:     Gallstones  Morbid obesity (BMI 55)    Plan:     Laparoscopic cholecystectomy/IOC. Risks, benefit, and alternative to surgery was discussed with the patient. The risks of surgery include but not limited to infection, bleeding, intraabdominal organ injury, bile duct injury, retained stone, bile leak, possible conversion to open, and the risks of general anesthetic. Patient is agreeable to surgery. All questions answered.

## 2018-06-22 NOTE — LETTER
DATE:  2018 TO WHOM IT MAY CONCERN: 
 
 
RE:  Elver BAILON.:  1970 The above named patient was seen in this office on 2018 . The patient is scheduled for surgery on 2018 . The patient is under my care and should remain out of work/school from 2018 until approximately 2018. The patient may return to full duty without restrictions on 7/10/2018. If you have any questions regarding this patient, please feel free to contact our office at [phone]. Sincerely, Snow Wade MD

## 2018-06-25 ENCOUNTER — HOSPITAL ENCOUNTER (OUTPATIENT)
Dept: NON INVASIVE DIAGNOSTICS | Age: 48
Discharge: HOME | End: 2018-06-25
Payer: COMMERCIAL

## 2018-06-25 DIAGNOSIS — K80.20 GALLSTONES: ICD-10-CM

## 2018-06-25 DIAGNOSIS — Z01.818 PREOP TESTING: ICD-10-CM

## 2018-06-25 PROCEDURE — 93005 ELECTROCARDIOGRAM TRACING: CPT

## 2018-06-26 LAB
ATRIAL RATE: 63 BPM
CALCULATED P AXIS, ECG09: 51 DEGREES
CALCULATED R AXIS, ECG10: 44 DEGREES
CALCULATED T AXIS, ECG11: 37 DEGREES
DIAGNOSIS, 93000: NORMAL
P-R INTERVAL, ECG05: 126 MS
Q-T INTERVAL, ECG07: 422 MS
QRS DURATION, ECG06: 80 MS
QTC CALCULATION (BEZET), ECG08: 431 MS
VENTRICULAR RATE, ECG03: 63 BPM

## 2018-06-28 NOTE — PERIOP NOTES
[facility]  Preoperative Instructions        Surgery Date 6/29/18          Time of Arrival 0800 Contact # [phone]    1. On the day of your surgery, please report to the Surgical Services Registration Desk and sign in at your designated time. The Surgery Center is located to the right of the Emergency Room. 2. You must have someone with you to drive you home. You should not drive a car for 24 hours following surgery. Please make arrangements for a friend or family member to stay with you for the first 24 hours after your surgery. 3. Do not have anything to eat or drink (including water, gum, mints, coffee, juice) after midnight 6/28/18  . ? This may not apply to medications prescribed by your physician. ?(Please note below the special instructions with medications to take the morning of your procedure.)    4. We recommend you do not drink any alcoholic beverages for 24 hours before and after your surgery. 5. Contact your surgeons office for instructions on the following medications: non-steroidal anti-inflammatory drugs (i.e. Advil, Aleve), vitamins, and supplements. (Some surgeons will want you to stop these medications prior to surgery and others may allow you to take them)  **If you are currently taking Plavix, Coumadin, Aspirin and/or other blood-thinning agents, contact your surgeon for instructions. ** Your surgeon will partner with the physician prescribing these medications to determine if it is safe to stop or if you need to continue taking. Please do not stop taking these medications without instructions from your surgeon    6. Wear comfortable clothes. Wear glasses instead of contacts. Do not bring any money or jewelry. Please bring picture ID, insurance card, and any prearranged co-payment or hospital payment. Do not wear make-up, particularly mascara the morning of your surgery. Do not wear nail polish, particularly if you are having foot /hand surgery. Wear your hair loose or down, no ponytails, buns, jose pins or clips. All body piercings must be removed. Please shower with antibacterial soap for three consecutive days before and on the morning of surgery, but do not apply any lotions, powders or deodorants after the shower on the day of surgery. Please use a fresh towels after each shower. Please sleep in clean clothes and change bed linens the night before surgery. Please do not shave for 48 hours prior to surgery. Shaving of the face is acceptable. 7. You should understand that if you do not follow these instructions your surgery may be cancelled. If your physical condition changes (I.e. fever, cold or flu) please contact your surgeon as soon as possible. 8. It is important that you be on time. If a situation occurs where you may be late, please call [phone] (OR Holding Area). 9. If you have any questions and or problems, please call [phone] (Pre-admission Testing). 10. Your surgery time may be subject to change. You will receive a phone call the evening prior if your time changes. 11.  If having outpatient surgery, you must have someone to drive you here, stay with you during the duration of your stay, and to drive you home at time of discharge. 12.   In an effort to improve the efficiency, privacy, and safety for all of our Pre-op patients visitors are not allowed in the Holding area. Once you arrive and are registered your family/visitors will be asked to remain in the waiting room. The Pre-op staff will get you from the Surgical Waiting Area and will explain to you and your family/visitors that the Pre-op phase is beginning. The staff will answer any questions and provide instructions for tracking of the patient, by use of the existing tracking number and color-coded status board in the waiting room.   At this time the staff will also ask for your designated spokesperson information in the event that the physician or staff need to provide an update or obtain any pertinent information. The designated spokesperson will be notified if the physician needs to speak to family during the pre-operative phase. If at any time your family/visitors has questions or concerns they may approach the volunteer desk in the waiting area for assistance. Special Instructions: none     MEDICATIONS TO TAKE THE MORNING OF SURGERY WITH A SIP OF WATER:wellbutrin, Celexa, Protonix, levothyroxine, Zofran and hydrocodone- acetaminophen as needed      I understand a pre-operative phone call will be made to verify my surgery time. In the event that I am not available, I give permission for a message to be left on my answering service and/or with another person?   yes         ___________________      __________   _________    (Signature of Patient)             (Witness)                (Date and Time)

## 2018-06-29 ENCOUNTER — ANESTHESIA (OUTPATIENT)
Dept: SURGERY | Age: 48
End: 2018-06-29
Payer: COMMERCIAL

## 2018-06-29 ENCOUNTER — APPOINTMENT (OUTPATIENT)
Dept: GENERAL RADIOLOGY | Age: 48
End: 2018-06-29
Attending: SURGERY
Payer: COMMERCIAL

## 2018-06-29 ENCOUNTER — ANESTHESIA EVENT (OUTPATIENT)
Dept: SURGERY | Age: 48
End: 2018-06-29
Payer: COMMERCIAL

## 2018-06-29 ENCOUNTER — HOSPITAL ENCOUNTER (OUTPATIENT)
Age: 48
Setting detail: OUTPATIENT SURGERY
Discharge: HOME | End: 2018-06-29
Attending: SURGERY | Admitting: SURGERY
Payer: COMMERCIAL

## 2018-06-29 VITALS
OXYGEN SATURATION: 95 % | HEIGHT: 67 IN | SYSTOLIC BLOOD PRESSURE: 129 MMHG | DIASTOLIC BLOOD PRESSURE: 58 MMHG | BODY MASS INDEX: 45.99 KG/M2 | WEIGHT: 293 LBS | HEART RATE: 55 BPM | TEMPERATURE: 97.9 F | RESPIRATION RATE: 16 BRPM

## 2018-06-29 DIAGNOSIS — K80.20 GALLSTONES: Primary | ICD-10-CM

## 2018-06-29 PROCEDURE — 77030020782 HC GWN BAIR PAWS FLX 3M -B

## 2018-06-29 PROCEDURE — 88304 TISSUE EXAM BY PATHOLOGIST: CPT | Performed by: SURGERY

## 2018-06-29 PROCEDURE — 74011000250 HC RX REV CODE- 250: Performed by: ANESTHESIOLOGY

## 2018-06-29 PROCEDURE — 77030035048 HC TRCR ENDOSC OPTCL COVD -B: Performed by: SURGERY

## 2018-06-29 PROCEDURE — 77030002895 HC DEV VASC CLOSR COVD -B: Performed by: SURGERY

## 2018-06-29 PROCEDURE — 74011636320 HC RX REV CODE- 636/320: Performed by: SURGERY

## 2018-06-29 PROCEDURE — 74011250636 HC RX REV CODE- 250/636: Performed by: ANESTHESIOLOGY

## 2018-06-29 PROCEDURE — 77030018547 HC SUT ETHBND1 J&J -B: Performed by: SURGERY

## 2018-06-29 PROCEDURE — 74011000250 HC RX REV CODE- 250

## 2018-06-29 PROCEDURE — 77030020256 HC SOL INJ NACL 0.9%  500ML: Performed by: SURGERY

## 2018-06-29 PROCEDURE — 76210000017 HC OR PH I REC 1.5 TO 2 HR: Performed by: SURGERY

## 2018-06-29 PROCEDURE — 77030039266 HC ADH SKN EXOFIN S2SG -A: Performed by: SURGERY

## 2018-06-29 PROCEDURE — 74011250636 HC RX REV CODE- 250/636: Performed by: SURGERY

## 2018-06-29 PROCEDURE — 77030026438 HC STYL ET INTUB CARD -A: Performed by: NURSE ANESTHETIST, CERTIFIED REGISTERED

## 2018-06-29 PROCEDURE — 77030012022 HC APPL CLP ENDOSC COVD -C: Performed by: SURGERY

## 2018-06-29 PROCEDURE — 77030020263 HC SOL INJ SOD CL0.9% LFCR 1000ML: Performed by: SURGERY

## 2018-06-29 PROCEDURE — 74011250636 HC RX REV CODE- 250/636

## 2018-06-29 PROCEDURE — C1758 CATHETER, URETERAL: HCPCS | Performed by: SURGERY

## 2018-06-29 PROCEDURE — 77030008684 HC TU ET CUF COVD -B: Performed by: NURSE ANESTHETIST, CERTIFIED REGISTERED

## 2018-06-29 PROCEDURE — 76210000021 HC REC RM PH II 0.5 TO 1 HR: Performed by: SURGERY

## 2018-06-29 PROCEDURE — 74011250637 HC RX REV CODE- 250/637

## 2018-06-29 PROCEDURE — 77030018390 HC SPNG HEMSTAT2 J&J -B: Performed by: SURGERY

## 2018-06-29 PROCEDURE — 77030032435

## 2018-06-29 PROCEDURE — 77030019908 HC STETH ESOPH SIMS -A: Performed by: NURSE ANESTHETIST, CERTIFIED REGISTERED

## 2018-06-29 PROCEDURE — 77030031139 HC SUT VCRL2 J&J -A: Performed by: SURGERY

## 2018-06-29 PROCEDURE — 77030035045 HC TRCR ENDOSC VRSPRT BLDLSS COVD -B: Performed by: SURGERY

## 2018-06-29 PROCEDURE — 77030008756 HC TU IRR SUC STRY -B: Performed by: SURGERY

## 2018-06-29 PROCEDURE — 77030020053 HC ELECTRD LAPSCP COVD -B: Performed by: SURGERY

## 2018-06-29 PROCEDURE — 77030035051: Performed by: SURGERY

## 2018-06-29 PROCEDURE — 74300 X-RAY BILE DUCTS/PANCREAS: CPT

## 2018-06-29 PROCEDURE — 74011000250 HC RX REV CODE- 250: Performed by: SURGERY

## 2018-06-29 PROCEDURE — 77030011640 HC PAD GRND REM COVD -A: Performed by: SURGERY

## 2018-06-29 PROCEDURE — 76060000035 HC ANESTHESIA 2 TO 2.5 HR: Performed by: SURGERY

## 2018-06-29 PROCEDURE — 77030037892: Performed by: SURGERY

## 2018-06-29 PROCEDURE — 77030032490 HC SLV COMPR SCD KNE COVD -B: Performed by: SURGERY

## 2018-06-29 PROCEDURE — 76010000131 HC OR TIME 2 TO 2.5 HR: Performed by: SURGERY

## 2018-06-29 RX ORDER — LIDOCAINE HYDROCHLORIDE 10 MG/ML
0.1 INJECTION, SOLUTION EPIDURAL; INFILTRATION; INTRACAUDAL; PERINEURAL AS NEEDED
Status: DISCONTINUED | OUTPATIENT
Start: 2018-06-29 | End: 2018-06-29 | Stop reason: HOSPADM

## 2018-06-29 RX ORDER — ROCURONIUM BROMIDE 10 MG/ML
INJECTION, SOLUTION INTRAVENOUS AS NEEDED
Status: DISCONTINUED | OUTPATIENT
Start: 2018-06-29 | End: 2018-06-29 | Stop reason: HOSPADM

## 2018-06-29 RX ORDER — BUPIVACAINE HYDROCHLORIDE AND EPINEPHRINE 2.5; 5 MG/ML; UG/ML
30 INJECTION, SOLUTION EPIDURAL; INFILTRATION; INTRACAUDAL; PERINEURAL ONCE
Status: COMPLETED | OUTPATIENT
Start: 2018-06-29 | End: 2018-06-29

## 2018-06-29 RX ORDER — SODIUM CHLORIDE 0.9 % (FLUSH) 0.9 %
5-10 SYRINGE (ML) INJECTION AS NEEDED
Status: DISCONTINUED | OUTPATIENT
Start: 2018-06-29 | End: 2018-06-29 | Stop reason: HOSPADM

## 2018-06-29 RX ORDER — ACETAMINOPHEN 10 MG/ML
INJECTION, SOLUTION INTRAVENOUS AS NEEDED
Status: DISCONTINUED | OUTPATIENT
Start: 2018-06-29 | End: 2018-06-29 | Stop reason: HOSPADM

## 2018-06-29 RX ORDER — PROPOFOL 10 MG/ML
INJECTION, EMULSION INTRAVENOUS AS NEEDED
Status: DISCONTINUED | OUTPATIENT
Start: 2018-06-29 | End: 2018-06-29 | Stop reason: HOSPADM

## 2018-06-29 RX ORDER — SODIUM CHLORIDE, SODIUM LACTATE, POTASSIUM CHLORIDE, CALCIUM CHLORIDE 600; 310; 30; 20 MG/100ML; MG/100ML; MG/100ML; MG/100ML
INJECTION, SOLUTION INTRAVENOUS
Status: DISCONTINUED | OUTPATIENT
Start: 2018-06-29 | End: 2018-06-29 | Stop reason: HOSPADM

## 2018-06-29 RX ORDER — MIDAZOLAM HYDROCHLORIDE 1 MG/ML
INJECTION, SOLUTION INTRAMUSCULAR; INTRAVENOUS AS NEEDED
Status: DISCONTINUED | OUTPATIENT
Start: 2018-06-29 | End: 2018-06-29 | Stop reason: HOSPADM

## 2018-06-29 RX ORDER — HYDROCODONE BITARTRATE AND ACETAMINOPHEN 5; 325 MG/1; MG/1
1-2 TABLET ORAL
Qty: 50 TAB | Refills: 0 | Status: SHIPPED | OUTPATIENT
Start: 2018-06-29 | End: 2019-03-25 | Stop reason: ALTCHOICE

## 2018-06-29 RX ORDER — FENTANYL CITRATE 50 UG/ML
INJECTION, SOLUTION INTRAMUSCULAR; INTRAVENOUS AS NEEDED
Status: DISCONTINUED | OUTPATIENT
Start: 2018-06-29 | End: 2018-06-29 | Stop reason: HOSPADM

## 2018-06-29 RX ORDER — HEPARIN SODIUM 5000 [USP'U]/ML
5000 INJECTION, SOLUTION INTRAVENOUS; SUBCUTANEOUS
Status: COMPLETED | OUTPATIENT
Start: 2018-06-29 | End: 2018-06-29

## 2018-06-29 RX ORDER — SODIUM CHLORIDE 0.9 % (FLUSH) 0.9 %
5-10 SYRINGE (ML) INJECTION EVERY 8 HOURS
Status: DISCONTINUED | OUTPATIENT
Start: 2018-06-29 | End: 2018-06-29 | Stop reason: HOSPADM

## 2018-06-29 RX ORDER — NEOSTIGMINE METHYLSULFATE 1 MG/ML
INJECTION INTRAVENOUS AS NEEDED
Status: DISCONTINUED | OUTPATIENT
Start: 2018-06-29 | End: 2018-06-29 | Stop reason: HOSPADM

## 2018-06-29 RX ORDER — SODIUM CHLORIDE, SODIUM LACTATE, POTASSIUM CHLORIDE, CALCIUM CHLORIDE 600; 310; 30; 20 MG/100ML; MG/100ML; MG/100ML; MG/100ML
25 INJECTION, SOLUTION INTRAVENOUS CONTINUOUS
Status: DISCONTINUED | OUTPATIENT
Start: 2018-06-29 | End: 2018-06-29 | Stop reason: HOSPADM

## 2018-06-29 RX ORDER — PHENYLEPHRINE HCL IN 0.9% NACL 0.4MG/10ML
SYRINGE (ML) INTRAVENOUS AS NEEDED
Status: DISCONTINUED | OUTPATIENT
Start: 2018-06-29 | End: 2018-06-29 | Stop reason: HOSPADM

## 2018-06-29 RX ORDER — FENTANYL CITRATE 50 UG/ML
INJECTION, SOLUTION INTRAMUSCULAR; INTRAVENOUS
Status: COMPLETED
Start: 2018-06-29 | End: 2018-06-29

## 2018-06-29 RX ORDER — SCOLOPAMINE TRANSDERMAL SYSTEM 1 MG/1
PATCH, EXTENDED RELEASE TRANSDERMAL AS NEEDED
Status: DISCONTINUED | OUTPATIENT
Start: 2018-06-29 | End: 2018-06-29 | Stop reason: HOSPADM

## 2018-06-29 RX ORDER — CEPHALEXIN 500 MG/1
500 CAPSULE ORAL 3 TIMES DAILY
Qty: 21 CAP | Refills: 0 | Status: SHIPPED | OUTPATIENT
Start: 2018-06-29 | End: 2018-07-06

## 2018-06-29 RX ORDER — PROCHLORPERAZINE EDISYLATE 5 MG/ML
INJECTION INTRAMUSCULAR; INTRAVENOUS
Status: DISCONTINUED
Start: 2018-06-29 | End: 2018-06-29 | Stop reason: HOSPADM

## 2018-06-29 RX ORDER — SUCCINYLCHOLINE CHLORIDE 20 MG/ML
INJECTION INTRAMUSCULAR; INTRAVENOUS AS NEEDED
Status: DISCONTINUED | OUTPATIENT
Start: 2018-06-29 | End: 2018-06-29 | Stop reason: HOSPADM

## 2018-06-29 RX ORDER — HYDROCODONE BITARTRATE AND ACETAMINOPHEN 5; 325 MG/1; MG/1
1-2 TABLET ORAL
Status: DISCONTINUED | OUTPATIENT
Start: 2018-06-29 | End: 2018-06-29 | Stop reason: HOSPADM

## 2018-06-29 RX ORDER — ONDANSETRON 2 MG/ML
INJECTION INTRAMUSCULAR; INTRAVENOUS AS NEEDED
Status: DISCONTINUED | OUTPATIENT
Start: 2018-06-29 | End: 2018-06-29 | Stop reason: HOSPADM

## 2018-06-29 RX ORDER — HYDROCODONE BITARTRATE AND ACETAMINOPHEN 5; 325 MG/1; MG/1
TABLET ORAL
Status: COMPLETED
Start: 2018-06-29 | End: 2018-06-29

## 2018-06-29 RX ORDER — GLYCOPYRROLATE 0.2 MG/ML
INJECTION INTRAMUSCULAR; INTRAVENOUS AS NEEDED
Status: DISCONTINUED | OUTPATIENT
Start: 2018-06-29 | End: 2018-06-29 | Stop reason: HOSPADM

## 2018-06-29 RX ORDER — FENTANYL CITRATE 50 UG/ML
25 INJECTION, SOLUTION INTRAMUSCULAR; INTRAVENOUS
Status: DISCONTINUED | OUTPATIENT
Start: 2018-06-29 | End: 2018-06-29 | Stop reason: HOSPADM

## 2018-06-29 RX ORDER — DEXAMETHASONE SODIUM PHOSPHATE 4 MG/ML
INJECTION, SOLUTION INTRA-ARTICULAR; INTRALESIONAL; INTRAMUSCULAR; INTRAVENOUS; SOFT TISSUE AS NEEDED
Status: DISCONTINUED | OUTPATIENT
Start: 2018-06-29 | End: 2018-06-29 | Stop reason: HOSPADM

## 2018-06-29 RX ORDER — DIPHENHYDRAMINE HYDROCHLORIDE 50 MG/ML
12.5 INJECTION, SOLUTION INTRAMUSCULAR; INTRAVENOUS AS NEEDED
Status: DISCONTINUED | OUTPATIENT
Start: 2018-06-29 | End: 2018-06-29 | Stop reason: HOSPADM

## 2018-06-29 RX ORDER — HYDROMORPHONE HYDROCHLORIDE 1 MG/ML
0.2 INJECTION, SOLUTION INTRAMUSCULAR; INTRAVENOUS; SUBCUTANEOUS
Status: DISCONTINUED | OUTPATIENT
Start: 2018-06-29 | End: 2018-06-29 | Stop reason: HOSPADM

## 2018-06-29 RX ORDER — ONDANSETRON 4 MG/1
4 TABLET, FILM COATED ORAL
Qty: 20 TAB | Refills: 0 | Status: SHIPPED | OUTPATIENT
Start: 2018-06-29 | End: 2019-03-25 | Stop reason: ALTCHOICE

## 2018-06-29 RX ADMIN — FENTANYL CITRATE 25 MCG: 50 INJECTION, SOLUTION INTRAMUSCULAR; INTRAVENOUS at 12:51

## 2018-06-29 RX ADMIN — GLYCOPYRROLATE 0.2 MG: 0.2 INJECTION INTRAMUSCULAR; INTRAVENOUS at 11:55

## 2018-06-29 RX ADMIN — SODIUM CHLORIDE, SODIUM LACTATE, POTASSIUM CHLORIDE, AND CALCIUM CHLORIDE 25 ML/HR: 600; 310; 30; 20 INJECTION, SOLUTION INTRAVENOUS at 09:05

## 2018-06-29 RX ADMIN — SODIUM CHLORIDE, SODIUM LACTATE, POTASSIUM CHLORIDE, CALCIUM CHLORIDE: 600; 310; 30; 20 INJECTION, SOLUTION INTRAVENOUS at 09:54

## 2018-06-29 RX ADMIN — FENTANYL CITRATE 25 MCG: 50 INJECTION, SOLUTION INTRAMUSCULAR; INTRAVENOUS at 12:30

## 2018-06-29 RX ADMIN — PROCHLORPERAZINE EDISYLATE 5 MG: 5 INJECTION INTRAMUSCULAR; INTRAVENOUS at 12:48

## 2018-06-29 RX ADMIN — HYDROCODONE BITARTRATE AND ACETAMINOPHEN 1 TABLET: 5; 325 TABLET ORAL at 13:29

## 2018-06-29 RX ADMIN — PROPOFOL 20 MG: 10 INJECTION, EMULSION INTRAVENOUS at 10:06

## 2018-06-29 RX ADMIN — MIDAZOLAM HYDROCHLORIDE 2 MG: 1 INJECTION, SOLUTION INTRAMUSCULAR; INTRAVENOUS at 09:54

## 2018-06-29 RX ADMIN — FENTANYL CITRATE 50 MCG: 50 INJECTION, SOLUTION INTRAMUSCULAR; INTRAVENOUS at 09:54

## 2018-06-29 RX ADMIN — PROPOFOL 50 MG: 10 INJECTION, EMULSION INTRAVENOUS at 11:02

## 2018-06-29 RX ADMIN — FENTANYL CITRATE 25 MCG: 50 INJECTION, SOLUTION INTRAMUSCULAR; INTRAVENOUS at 12:10

## 2018-06-29 RX ADMIN — NEOSTIGMINE METHYLSULFATE 5 MG: 1 INJECTION INTRAVENOUS at 11:40

## 2018-06-29 RX ADMIN — ROCURONIUM BROMIDE 50 MG: 10 INJECTION, SOLUTION INTRAVENOUS at 10:13

## 2018-06-29 RX ADMIN — Medication 40 MCG: at 10:20

## 2018-06-29 RX ADMIN — FENTANYL CITRATE 25 MCG: 50 INJECTION, SOLUTION INTRAMUSCULAR; INTRAVENOUS at 12:20

## 2018-06-29 RX ADMIN — GLYCOPYRROLATE 0.6 MG: 0.2 INJECTION INTRAMUSCULAR; INTRAVENOUS at 11:40

## 2018-06-29 RX ADMIN — FENTANYL CITRATE 50 MCG: 50 INJECTION, SOLUTION INTRAMUSCULAR; INTRAVENOUS at 10:14

## 2018-06-29 RX ADMIN — PROPOFOL 180 MG: 10 INJECTION, EMULSION INTRAVENOUS at 10:05

## 2018-06-29 RX ADMIN — PROPOFOL 50 MG: 10 INJECTION, EMULSION INTRAVENOUS at 11:00

## 2018-06-29 RX ADMIN — Medication 80 MCG: at 10:17

## 2018-06-29 RX ADMIN — ACETAMINOPHEN 1000 MG: 10 INJECTION, SOLUTION INTRAVENOUS at 10:30

## 2018-06-29 RX ADMIN — SODIUM CHLORIDE, SODIUM LACTATE, POTASSIUM CHLORIDE, CALCIUM CHLORIDE: 600; 310; 30; 20 INJECTION, SOLUTION INTRAVENOUS at 11:37

## 2018-06-29 RX ADMIN — DEXAMETHASONE SODIUM PHOSPHATE 4 MG: 4 INJECTION, SOLUTION INTRA-ARTICULAR; INTRALESIONAL; INTRAMUSCULAR; INTRAVENOUS; SOFT TISSUE at 10:17

## 2018-06-29 RX ADMIN — HEPARIN SODIUM 5000 UNITS: 5000 INJECTION, SOLUTION INTRAVENOUS; SUBCUTANEOUS at 09:05

## 2018-06-29 RX ADMIN — CEFAZOLIN 3 G: 1 INJECTION, POWDER, FOR SOLUTION INTRAMUSCULAR; INTRAVENOUS; PARENTERAL at 10:15

## 2018-06-29 RX ADMIN — FENTANYL CITRATE 25 MCG: 50 INJECTION, SOLUTION INTRAMUSCULAR; INTRAVENOUS at 12:15

## 2018-06-29 RX ADMIN — ONDANSETRON 4 MG: 2 INJECTION INTRAMUSCULAR; INTRAVENOUS at 11:06

## 2018-06-29 RX ADMIN — SUCCINYLCHOLINE CHLORIDE 160 MG: 20 INJECTION INTRAMUSCULAR; INTRAVENOUS at 10:06

## 2018-06-29 RX ADMIN — ROCURONIUM BROMIDE 10 MG: 10 INJECTION, SOLUTION INTRAVENOUS at 11:02

## 2018-06-29 RX ADMIN — GLYCOPYRROLATE 0.2 MG: 0.2 INJECTION INTRAMUSCULAR; INTRAVENOUS at 11:45

## 2018-06-29 RX ADMIN — SCOLOPAMINE TRANSDERMAL SYSTEM 1 PATCH: 1 PATCH, EXTENDED RELEASE TRANSDERMAL at 08:38

## 2018-06-29 NOTE — ANESTHESIA PREPROCEDURE EVALUATION
Anesthetic History   No history of anesthetic complications            Review of Systems / Medical History  Patient summary reviewed, nursing notes reviewed and pertinent labs reviewed    Pulmonary          Shortness of breath         Neuro/Psych         Psychiatric history     Cardiovascular  Within defined limits                Exercise tolerance: >4 METS  Comments: TTE (8/22 /16):  EF=55-60%   GI/Hepatic/Renal     GERD          Comments: Cholelithiasis  Miranda's Esophagus Endo/Other      Hypothyroidism: well controlled  Morbid obesity     Other Findings   Comments: Systemic Lupus Erythematosus  Sjogren's Syndrome         Physical Exam    Airway  Mallampati: III  TM Distance: > 6 cm  Neck ROM: normal range of motion   Mouth opening: Normal     Cardiovascular  Regular rate and rhythm,  S1 and S2 normal,  no murmur, click, rub, or gallop             Dental  No notable dental hx       Pulmonary  Breath sounds clear to auscultation               Abdominal  GI exam deferred       Other Findings            Anesthetic Plan    ASA: 3  Anesthesia type: general and total IV anesthesia          Induction: Intravenous  Anesthetic plan and risks discussed with: Patient

## 2018-06-29 NOTE — IP AVS SNAPSHOT
[address] [facility] 
[phone] Patient: Clover Diaz MRN: [id number] ASB:[birth year] About your hospitalization You were admitted on:  June 29, 2018 You last received care in the:  [facility] PACU You were discharged on:  June 29, 2018 Why you were hospitalized Your primary diagnosis was:  Not on File Follow-up Information Follow up With Details Comments Contact Info MD Kennedy Narvaez [address] [facility] 
[phone] Your Scheduled Appointments Monday July 09, 2018  2:20 PM EDT  
POST OP with Grey Pinedo MD  
[facility] Dr. Medardo Dennis [facility] ([address] [facility]) [address] [place] [address], [address] [facility]  
[phone] Discharge Orders None A check debra indicates which time of day the medication should be taken. My Medications START taking these medications Instructions Each Dose to Equal  
 Morning Noon Evening Bedtime  
 cephALEXin 500 mg capsule Commonly known as:  Ivetanegenaro Garcia Your last dose was: Your next dose is: Take 1 Cap by mouth three (3) times daily for 7 days. 500 mg  
    
   
   
   
  
 ondansetron hcl 4 mg tablet Commonly known as:  Renetta Actis Your last dose was: Your next dose is: Take 1 Tab by mouth every eight (8) hours as needed for Nausea. 4 mg CHANGE how you take these medications Instructions Each Dose to Equal  
 Morning Noon Evening Bedtime * HYDROcodone-acetaminophen 5-325 mg per tablet Commonly known as:  Cindyjasmine Mcnair What changed:  Another medication with the same name was added. Make sure you understand how and when to take each. Your last dose was: Your next dose is: Take 1 Tab by mouth every four (4) hours as needed for Pain. Max Daily Amount: 6 Tabs. 1 Tab * HYDROcodone-acetaminophen 5-325 mg per tablet Commonly known as:  Caren Vang What changed: You were already taking a medication with the same name, and this prescription was added. Make sure you understand how and when to take each. Your last dose was: Your next dose is: Take 1-2 Tabs by mouth every four (4) hours as needed for Pain. Max Daily Amount: 12 Tabs. 1-2 Tab  
    
   
   
   
  
 levothyroxine 150 mcg tablet Commonly known as:  SYNTHROID What changed:   
- how much to take 
- additional instructions Your last dose was: Your next dose is: Take 1 Tab by mouth Daily (before breakfast). Needs labs 150 mcg * Notice: This list has 2 medication(s) that are the same as other medications prescribed for you. Read the directions carefully, and ask your doctor or other care provider to review them with you. CONTINUE taking these medications Instructions Each Dose to Equal  
 Morning Noon Evening Bedtime buPROPion  mg tablet Commonly known as:  Coby Urena Your last dose was: Your next dose is: TAKE 1 TABLET BY MOUTH EVERY MORNING  
     
   
   
   
  
 citalopram 40 mg tablet Commonly known as:  Maggie Fulton Your last dose was: Your next dose is: TAKE 1 TABLET BY MOUTH EVERY DAY IMURAN 50 mg tablet Generic drug:  azaTHIOprine Your last dose was: Your next dose is: Take 50 mg by mouth three (3) times daily. 100 mg in am, 50 mg evening  Indications: Systemic Lupus Erythematosus 50 mg  
    
   
   
   
  
 omeprazole 40 mg capsule Commonly known as:  PRILOSEC Your last dose was: Your next dose is: Take 40 mg by mouth two (2) times a day. 40 mg  
    
   
   
   
  
 ondansetron 4 mg disintegrating tablet Commonly known as:  ZOFRAN ODT  
 Your last dose was: Your next dose is: Take 1 Tab by mouth every eight (8) hours as needed for Nausea. 4 mg  
    
   
   
   
  
 pantoprazole 40 mg tablet Commonly known as:  PROTONIX Your last dose was: Your next dose is: Take 40 mg by mouth two (2) times a day. 40 mg  
    
   
   
   
  
 PLAQUENIL 200 mg tablet Generic drug:  hydroxychloroquine Your last dose was: Your next dose is: Take 200 mg by mouth two (2) times a day. 200 mg  
    
   
   
   
  
 VITAMIN D3 2,000 unit Tab Generic drug:  cholecalciferol (vitamin D3) Your last dose was: Your next dose is: Take  by mouth. XIIDRA 5 % Dpet Generic drug:  lifitegrast  
   
Your last dose was: Your next dose is:    
   
   
 INSTILL 1 DROP INTO BOTH EYES BID Where to Get Your Medications Information on where to get these meds will be given to you by the nurse or doctor. ! Ask your nurse or doctor about these medications  
  cephALEXin 500 mg capsule HYDROcodone-acetaminophen 5-325 mg per tablet  
 ondansetron hcl 4 mg tablet Opioid Education Prescription Opioids: What You Need to Know: 
 
 
Please take over the counter stool softener or miralax if you develop constipation. If you experience difficulty voiding, a lot of drainage, develop redness around the wound, or a fever over 101 F occurs please call the office. Narcotics and anesthesia sometimes cause nausea and vomiting. If persistent please call the office. Do not hesitate to call with questions or concerns. Follow-up with Dr. Tony Velazquez in 2 weeks. Please call [phone] for an appointment. Information obtained by : 
I understand that if any problems occur once I am at home I am to contact my physician. I understand and acknowledge receipt of the instructions indicated above. Physician's or R.N.'s Signature                                                                  Date/Time Patient or Representative Signature                                                          Date/Time DISCHARGE SUMMARY from Nurse PATIENT INSTRUCTIONS: 
 
After general anesthesia or intravenous sedation, for 24 hours or while taking prescription Narcotics: · Limit your activities · Do not drive and operate hazardous machinery · Do not make important personal or business decisions · Do  not drink alcoholic beverages · If you have not urinated within 8 hours after discharge, please contact your surgeon on call. Report the following to your surgeon: 
· Excessive pain, swelling, redness or odor of or around the surgical area · Temperature over 100.5 · Nausea and vomiting lasting longer than 4 hours or if unable to take medications · Any signs of decreased circulation or nerve impairment to extremity: change in color, persistent  numbness, tingling, coldness or increase pain · Any questions What to do at Home: How to Care for Your Wound After Its Treated With DERMABOND* Topical Skin Adhesive DERMABOND* Topical Skin Adhesive (2-octyl cyanoacrylate) is a sterile, liquid skin adhesive 
that holds wound edges together. The film will usually remain in place for 5 to 10 days, then 
naturally fall off your skin. The following will answer some of your questions and provide instructions for proper care for your 
wound while it is healing: CHECK WOUND APPEARANCE 
 Some swelling, redness, and pain are common with all wounds and normally will go away as the 
wound heals. If swelling, redness, or pain increases or if the wound feels warm to the touch, 
contact a doctor. Also contact a doctor if the wound edges reopen or separate. REPLACE BANDAGES 
 If your wound is bandaged, keep the bandage dry.  Replace the dressing daily until the adhesive film has fallen off or if the 
bandage should become wet, unless otherwise instructed by your 
physician.  When changing the dressing, do not place tape directly over the DERMABOND adhesive film, because removing the tape later may also 
remove the film. AVOID TOPICAL MEDICATIONS  Do not apply liquid or ointment medications or any other product to your wound while the DERMABOND adhesive film is in place. These may loosen the film before your wound is healed. KEEP WOUND DRY AND PROTECTED  You may occasionally and briefly wet your wound in the shower or bath. Do not soak or scrub 
your wound, do not swim, and avoid periods of heavy perspiration until the DERMABOND 
adhesive has naturally fallen off. After showering or bathing, gently blot your wound dry with a 
soft towel. If a protective dressing is being used, apply a fresh, dry bandage, being sure to keep 
the tape off the DERMABOND adhesive film.  Apply a clean, dry bandage over the wound if necessary to protect it.  
 Protect your wound from injury until the skin has had sufficient time to heal. 
  Do not scratch, rub, or pick at the DERMABOND adhesive film. This may loosen the film before 
your wound is healed.  Protect the wound from prolonged exposure to sunlight or tanning lamps while the film is in 
place. If you have any questions or concerns about this product, please consult your doctor. *Trademark ©[employer] Osorio Ireland common side effect of anesthesia following surgery is nausea and/or vomiting. In order to decrease symptoms, it is wise to avoid foods that are high in fat, greasy foods, milk products, and spicy foods for the first 24 hours. Acceptable foods for the first 24 hours following surgery include but are not limited to: 
 
? soup 
? broth 
?  toast  
? crackers ? applesauce 
? bananas  
? mashed potatoes, 
? soft or scrambled eggs 
? oatmeal 
?  jello It is important to eat when taking your pain medication. This will help to prevent nausea. If possible, please try to time your meals with your medications. It is very important to stay hydrated following surgery. Sip fluids frequently while awake. Avoid acidic drinks such as citrus juices and soda for 24 hours. Carbonated beverages may cause bloating and gas. Acceptable fluids include: 
 
? water (flavor packets may add variety) ? coffee or tea (in moderation) ? Gatorade ? Patti Corns ? apple juice 
? cranberry juice You are encouraged to cough and deep breathe every hour when awake. This will help to prevent respiratory complications following anesthesia. You may want to hug a pillow when coughing and sneezing to add additional support to the surgical area and to decrease discomfort if you had abdominal or chest surgery. If you are discharged home with support stockings, you may remove them after 24 hours. Support stockings are used to help prevent blood clots in the legs following surgery.  
 
Please take time to review all of your Home Care Instructions and Medication Information sheets provided in your discharge packet. If you have any questions, please contact your surgeons office. Thank you. Narcotic-Analgesic/Acetaminophen (Percocet, Norco, Lorcet HD, Lortab 10/325) - (By mouth) Why this medicine is used:  
Relieves pain. Contact a nurse or doctor right away if you have: 
· Extreme weakness, shallow breathing, slow heartbeat · Severe confusion, lightheadedness, dizziness, fainting · Yellow skin or eyes, dark urine or pale stools · Severe constipation, severe stomach pain, nausea, vomiting, loss of appetite · Sweating or cold, clammy skin Common side effects: · Mild constipation, nausea, vomiting · Sleepiness, tiredness · Itching, rash © 2017 [facility] Information is for End User's use only and may not be sold, redistributed or otherwise used for commercial purposes. Please carry medication information at all times in case of emergency situations. These are general instructions for a healthy lifestyle: No smoking/ No tobacco products/ Avoid exposure to second hand smoke Surgeon General's Warning:  Quitting smoking now greatly reduces serious risk to your health. Obesity, smoking, and sedentary lifestyle greatly increases your risk for illness A healthy diet, regular physical exercise & weight monitoring are important for maintaining a healthy lifestyle You may be retaining fluid if you have a history of heart failure or if you experience any of the following symptoms:  Weight gain of 3 pounds or more overnight or 5 pounds in a week, increased swelling in our hands or feet or shortness of breath while lying flat in bed. Please call your doctor as soon as you notice any of these symptoms; do not wait until your next office visit. Recognize signs and symptoms of STROKE: 
 
F-face looks uneven A-arms unable to move or move unevenly S-speech slurred or non-existent T-time-call 911 as soon as signs and symptoms begin-DO NOT go Back to bed or wait to see if you get better-TIME IS BRAIN. Warning Signs of HEART ATTACK Call 911 if you have these symptoms: 
? Chest discomfort. Most heart attacks involve discomfort in the center of the chest that lasts more than a few minutes, or that goes away and comes back. It can feel like uncomfortable pressure, squeezing, fullness, or pain. ? Discomfort in other areas of the upper body. Symptoms can include pain or discomfort in one or both arms, the back, neck, jaw, or stomach. ? Shortness of breath with or without chest discomfort. ? Other signs may include breaking out in a cold sweat, nausea, or lightheadedness. Don't wait more than five minutes to call [address]! Fast action can save your life. Calling 911 is almost always the fastest way to get lifesaving treatment. Emergency Medical Services staff can begin treatment when they arrive  up to an hour sooner than if someone gets to the hospital by car. The discharge information has been reviewed with the {PATIENT PARENT GUARDIAN:98709}. The {PATIENT PARENT GUARDIAN:50100} verbalized understanding. Discharge medications reviewed with the {Dishcarge meds reviewed DZWK:34319} and appropriate educational materials and side effects teaching were provided. ___________________________________________________________________________________________________________________________________ Introducing [facility]! Dear Olena Medeiros: Thank you for requesting a "[employer] account. Our records indicate that you already have an active "[employer] account. You can access your account anytime at [email or web address] [employer] [email or web address]. [email or web address] Did you know that you can access your hospital and ER discharge instructions at any time in MyChart? You can also review all of your test results from your hospital stay or ER visit. Additional Information If you have questions, please visit the Frequently Asked Questions section of the MyChart website at [email or web address] Remember, Cracklet is NOT to be used for urgent needs. For medical emergencies, dial 911. Now available from your iPhone and Android! Introducing Balta Beaver As a [facility]ie [facility]oud patient, I wanted to make you aware of our electronic visit tool called Balta YadavThounds. [employer] allows you to connect within minutes with a medical provider 24 hours a day, seven days a week via a mobile device or tablet or logging into a secure website from your computer. You can access Balta Yadavfin from anywhere in the [place]. A virtual visit might be right for you when you have a simple condition and feel like you just dont want to get out of bed, or cant get away from work for an appointment, when your regular Maddie Loud provider is not available (evenings, weekends or holidays), or when youre out of town and need minor care. Electronic visits cost only $49 and if the [employer] provider determines a prescription is needed to treat your condition, one can be electronically transmitted to a nearby pharmacy*. Please take a moment to enroll today if you have not already done so. The enrollment process is free and takes just a few minutes. To enroll, please download the [employer] nelly to your tablet or phone, or visit [email or web address] to enroll on your computer. And, as an [address] [place] [address] patient with a [employer] account, the results of your visits will be scanned into your electronic medical record and your primary care provider will be able to view the scanned results. We urge you to continue to see your regular Maddie Loud provider for your ongoing medical care.   And while your primary care provider may not be the one available when you seek a Balta Beaver virtual visit, the peace of mind you get from getting a real diagnosis real time can be priceless. For more information on Balta Beaver, view our Frequently Asked Questions (FAQs) at [email or web address]. Sincerely, 
 
Alejandro Grullon MD 
Chief Medical Officer [address] *:  certain medications cannot be prescribed via Balta Beaver Unresulted Labs-Please follow up with your PCP about these lab tests Order Current Status XR [facility] INTRAOPERATIVE In process Providers Seen During Your Hospitalization Provider Specialty Primary office phone Abel Roque MD General Surgery [phone] Your Primary Care Physician (PCP) Primary Care Physician Office Phone Office Fax Josué Gasuzette [phone] You are allergic to the following Allergen Reactions Aspirin Anaphylaxis Peanut Anaphylaxis Other Plant, Animal, Environmental Runny Nose Sneezing Hayfever Recent Documentation Height Weight BMI OB Status Smoking Status 1.702 m (!) 160.3 kg 55.35 kg/m2 Hysterectomy Never Smoker Emergency Contacts Name Discharge Info Relation Home Work Mobile StarJeremiah DISCHARGE CAREGIVER [3] Spouse [3] [phone] Patient Belongings The following personal items are in your possession at time of discharge: 
  Dental Appliances: None  Visual Aid: Glasses      Home Medications: None   Jewelry: None  Clothing: Undergarments, Pants, Shirt, Footwear    Other Valuables: Eyeglasses  Personal Items Sent to Safe: declined Please provide this summary of care documentation to your next provider. Signatures-by signing, you are acknowledging that this After Visit Summary has been reviewed with you and you have received a copy. Patient Signature:  ____________________________________________________________ Date:  ____________________________________________________________  
  
Marilee Francisco Javier Provider Signature:  ____________________________________________________________ Date:  ____________________________________________________________

## 2018-06-29 NOTE — ANESTHESIA POSTPROCEDURE EVALUATION
Post-Anesthesia Evaluation and Assessment    Patient: Jeanne Ospina MRN: [id number]  SSN: [id number]    YOB: 1970  Age: 50 y.o. Sex: female       Cardiovascular Function/Vital Signs  Visit Vitals    /85    Pulse 66    Temp 36.8 °C (98.3 °F)    Resp 17    Ht 5' 7\" (1.702 m)    Wt (!) 160.3 kg (353 lb 6.4 oz)    SpO2 99%    BMI 55.35 kg/m2       Patient is status post general, total IV anesthesia anesthesia for Procedure(s):  LAPAROSCOPIC CHOLECYSTECTOMY WITH GRAMS AND INCISIONAL HERNIA REPAIR. Nausea/Vomiting: None    Postoperative hydration reviewed and adequate. Pain:  Pain Scale 1: Numeric (0 - 10) (06/29/18 0854)  Pain Intensity 1: 0 (06/29/18 0854)   Managed    Neurological Status:   Neuro (WDL): Within Defined Limits (06/29/18 1428)   At baseline    Mental Status and Level of Consciousness: Arousable    Pulmonary Status:   O2 Device: Nasal cannula (06/29/18 1200)   Adequate oxygenation and airway patent    Complications related to anesthesia: None    Post-anesthesia assessment completed.  No concerns    Signed By: Agnieszka Albert MD     June 29, 2018

## 2018-06-29 NOTE — DISCHARGE INSTRUCTIONS
Patient Discharge Instructions    Tata Orozco / [id number] : 1970    Admitted 2018 Discharged: 2018         What to do at Home    Recommended diet: Low fat, Low cholesterol    Recommended activity: no heavy lifting > 20 lbs x 6 weeks; no driving while taking narcotics for pain. May take shower, but no bath x 2 weeks. Keep ice over the incision to minimize swelling. Please take over the counter stool softener or miralax if you develop constipation. If you experience difficulty voiding, a lot of drainage, develop redness around the wound, or a fever over 101 F occurs please call the office. Narcotics and anesthesia sometimes cause nausea and vomiting. If persistent please call the office. Do not hesitate to call with questions or concerns. Follow-up with Dr. Maria Elena Barboza in 2 weeks. Please call [phone] for an appointment. Information obtained by :  I understand that if any problems occur once I am at home I am to contact my physician. I understand and acknowledge receipt of the instructions indicated above.                                                                                                                                                Physician's or R.N.'s Signature                                                                  Date/Time                                                                                                                                              Patient or Representative Signature                                                          Date/Time      DISCHARGE SUMMARY from Nurse    PATIENT INSTRUCTIONS:    After general anesthesia or intravenous sedation, for 24 hours or while taking prescription Narcotics:  · Limit your activities  · Do not drive and operate hazardous machinery  · Do not make important personal or business decisions  · Do  not drink alcoholic beverages  · If you have not urinated within 8 hours after discharge, please contact your surgeon on call. Report the following to your surgeon:  · Excessive pain, swelling, redness or odor of or around the surgical area  · Temperature over 100.5  · Nausea and vomiting lasting longer than 4 hours or if unable to take medications  · Any signs of decreased circulation or nerve impairment to extremity: change in color, persistent  numbness, tingling, coldness or increase pain  · Any questions    What to do at Home:            How to Care for Your Wound After Its Treated With  DERMABOND* Topical Skin Adhesive  DERMABOND* Topical Skin Adhesive (2-octyl cyanoacrylate) is a sterile, liquid skin adhesive  that holds wound edges together. The film will usually remain in place for 5 to 10 days, then  naturally fall off your skin. The following will answer some of your questions and provide instructions for proper care for your  wound while it is healing:    CHECK WOUND APPEARANCE   Some swelling, redness, and pain are common with all wounds and normally will go away as the  wound heals. If swelling, redness, or pain increases or if the wound feels warm to the touch,  contact a doctor. Also contact a doctor if the wound edges reopen or separate. REPLACE BANDAGES   If your wound is bandaged, keep the bandage dry.  Replace the dressing daily until the adhesive film has fallen off or if the  bandage should become wet, unless otherwise instructed by your  physician.  When changing the dressing, do not place tape directly over the  DERMABOND adhesive film, because removing the tape later may also  remove the film. AVOID TOPICAL MEDICATIONS   Do not apply liquid or ointment medications or any other product to your wound while the  DERMABOND adhesive film is in place. These may loosen the film before your wound is healed. KEEP WOUND DRY AND PROTECTED   You may occasionally and briefly wet your wound in the shower or bath.  Do not soak or scrub  your wound, do not swim, and avoid periods of heavy perspiration until the DERMABOND  adhesive has naturally fallen off. After showering or bathing, gently blot your wound dry with a  soft towel. If a protective dressing is being used, apply a fresh, dry bandage, being sure to keep  the tape off the DERMABOND adhesive film.  Apply a clean, dry bandage over the wound if necessary to protect it.  Protect your wound from injury until the skin has had sufficient time to heal.   Do not scratch, rub, or pick at the DERMABOND adhesive film. This may loosen the film before  your wound is healed.  Protect the wound from prolonged exposure to sunlight or tanning lamps while the film is in  place. If you have any questions or concerns about this product, please consult your doctor. *Trademark ©[employer] Shankar Sea common side effect of anesthesia following surgery is nausea and/or vomiting. In order to decrease symptoms, it is wise to avoid foods that are high in fat, greasy foods, milk products, and spicy foods for the first 24 hours. Acceptable foods for the first 24 hours following surgery include but are not limited to:     soup   broth    toast    crackers    applesauce    bananas    mashed potatoes,   soft or scrambled eggs   oatmeal    jello    It is important to eat when taking your pain medication. This will help to prevent nausea. If possible, please try to time your meals with your medications. It is very important to stay hydrated following surgery. Sip fluids frequently while awake. Avoid acidic drinks such as citrus juices and soda for 24 hours. Carbonated beverages may cause bloating and gas. Acceptable fluids include:    - water (flavor packets may add variety)  - coffee or tea (in moderation)  - Gatorade  - Panchito-aid  - apple juice  - cranberry juice    You are encouraged to cough and deep breathe every hour when awake. This will help to prevent respiratory complications following anesthesia.  You may want to hug a pillow when coughing and sneezing to add additional support to the surgical area and to decrease discomfort if you had abdominal or chest surgery. If you are discharged home with support stockings, you may remove them after 24 hours. Support stockings are used to help prevent blood clots in the legs following surgery. Please take time to review all of your Home Care Instructions and Medication Information sheets provided in your discharge packet. If you have any questions, please contact your surgeons office. Thank you. Narcotic-Analgesic/Acetaminophen (Percocet, Norco, Lorcet HD, Lortab 10/325) - (By mouth)   Why this medicine is used:   Relieves pain. Contact a nurse or doctor right away if you have:  · Extreme weakness, shallow breathing, slow heartbeat  · Severe confusion, lightheadedness, dizziness, fainting  · Yellow skin or eyes, dark urine or pale stools  · Severe constipation, severe stomach pain, nausea, vomiting, loss of appetite  · Sweating or cold, clammy skin     Common side effects:  · Mild constipation, nausea, vomiting  · Sleepiness, tiredness  · Itching, rash  © 2017 [facility] Information is for End User's use only and may not be sold, redistributed or otherwise used for commercial purposes. Please carry medication information at all times in case of emergency situations. These are general instructions for a healthy lifestyle:    No smoking/ No tobacco products/ Avoid exposure to second hand smoke  Surgeon General's Warning:  Quitting smoking now greatly reduces serious risk to your health.     Obesity, smoking, and sedentary lifestyle greatly increases your risk for illness    A healthy diet, regular physical exercise & weight monitoring are important for maintaining a healthy lifestyle    You may be retaining fluid if you have a history of heart failure or if you experience any of the following symptoms:  Weight gain of 3 pounds or more overnight or 5 pounds in a week, increased swelling in our hands or feet or shortness of breath while lying flat in bed. Please call your doctor as soon as you notice any of these symptoms; do not wait until your next office visit. Recognize signs and symptoms of STROKE:    F-face looks uneven    A-arms unable to move or move unevenly    S-speech slurred or non-existent    T-time-call 911 as soon as signs and symptoms begin-DO NOT go       Back to bed or wait to see if you get better-TIME IS BRAIN. Warning Signs of HEART ATTACK     Call 911 if you have these symptoms:   Chest discomfort. Most heart attacks involve discomfort in the center of the chest that lasts more than a few minutes, or that goes away and comes back. It can feel like uncomfortable pressure, squeezing, fullness, or pain.  Discomfort in other areas of the upper body. Symptoms can include pain or discomfort in one or both arms, the back, neck, jaw, or stomach.  Shortness of breath with or without chest discomfort.  Other signs may include breaking out in a cold sweat, nausea, or lightheadedness. Don't wait more than five minutes to call 911 - MINUTES MATTER! Fast action can save your life. Calling 911 is almost always the fastest way to get lifesaving treatment. Emergency Medical Services staff can begin treatment when they arrive -- up to an hour sooner than if someone gets to the hospital by car. The discharge information has been reviewed with the patient and caregiver. The patient and caregiver verbalized understanding. Discharge medications reviewed with the patient and caregiver and appropriate educational materials and side effects teaching were provided.   ___________________________________________________________________________________________________________________________________

## 2018-06-29 NOTE — PERIOP NOTES
Handoff Report from Operating Room to PACU    Report received from IVANA Caldwell RN and Ailyn Ramesh CRNA regarding Cullen Olivarez. Surgeon(s):  Arthur Alfredo MD  And Procedure(s) (LRB):  LAPAROSCOPIC CHOLECYSTECTOMY WITH GRAMS AND INCISIONAL HERNIA REPAIR (N/A)  confirmed   with allergies and dressings discussed. Anesthesia type, drugs, patient history, complications, estimated blood loss, vital signs, intake and output, and last pain medication, lines, reversal medications and temperature were reviewed.

## 2018-06-29 NOTE — H&P (VIEW-ONLY)
Surgery Consult:  gallstones  Requesting physician:  ER    Subjective:   Patient 50 y.o.  female was seen in ER on 6/19/18 with an acute onset of severe achy epigastric pain radiating to the RUQ and back with nausea and vomiting. Patient has had similar symptoms on and off for about 1 year. Usually occurs 1-2 hours after eating fatty foods. No change in bowel habits. No diarrhea or constipation. No F/C/S. No history of jaundice or pancreatitis. Patient underwent US in the ER which showed cholelithiasis with pericholecystic fluid and tenderness. Patient had no leukocytosis and LFTs were unremarkable. Patient currently denies any abdominal pain. Past Medical & Surgical History:  Past Medical History:   Diagnosis Date    Anemia     Iron deficiency    Anxiety     Miranda's esophagus     GERD (gastroesophageal reflux disease)     Lupus     Sjoegren syndrome ([facility] [address]     Thyroid disease       Past Surgical History:   Procedure Laterality Date    COLONOSCOPY N/A 8/25/2017    COLONOSCOPY performed by Gabriela Gerber MD at [facility] ENDOSCOPY    HX [address]    HX ENDOSCOPY      HX HYSTERECTOMY  Dec 2013    bleeding       Social History:  Social History     Social History    Marital status:      Spouse name: N/A    Number of children: N/A    Years of education: N/A     Occupational History    Not on file. Social History Main Topics    Smoking status: Never Smoker    Smokeless tobacco: Never Used    Alcohol use Yes      Comment: rarely    Drug use: No    Sexual activity: Yes     Partners: Male     Other Topics Concern    Not on file     Social History Narrative        Family History:  Family History   Problem Relation Age of Onset    Diabetes Father     Heart Disease Father     Hypertension Father     Stroke Father     No Known Problems Mother     Hypertension Brother     Cancer Maternal Uncle      esophageal cancer.     Cancer Paternal Grandfather      lung Medications:  Current Outpatient Prescriptions   Medication Sig    omeprazole (PRILOSEC) 40 mg capsule Take 40 mg by mouth two (2) times a day.  XIIDRA 5 % dpet INSTILL 1 DROP INTO BOTH EYES BID    hydroxychloroquine (PLAQUENIL) 200 mg tablet Take 2 Tabs by mouth daily.  azaTHIOprine (IMURAN) 50 mg tablet Take 3 Tabs by mouth daily for 90 days.  hydroxychloroquine (PLAQUENIL) 200 mg tablet Take 200 mg by mouth two (2) times a day.  azaTHIOprine (IMURAN) 50 mg tablet Take 50 mg by mouth three (3) times daily. Indications: SYSTEMIC LUPUS ERYTHEMATOSUS    citalopram (CELEXA) 40 mg tablet TAKE 1 TABLET BY MOUTH EVERY DAY    buPROPion XL (WELLBUTRIN XL) 150 mg tablet TAKE 1 TABLET BY MOUTH EVERY MORNING    levothyroxine (SYNTHROID) 150 mcg tablet Take 1 Tab by mouth Daily (before breakfast). Needs labs (Patient taking differently: Take 175 mcg by mouth Daily (before breakfast). Needs labs)    ondansetron (ZOFRAN ODT) 4 mg disintegrating tablet Take 1 Tab by mouth every eight (8) hours as needed for Nausea.  HYDROcodone-acetaminophen (NORCO) 5-325 mg per tablet Take 1 Tab by mouth every four (4) hours as needed for Pain. Max Daily Amount: 6 Tabs.  pantoprazole (PROTONIX) 40 mg tablet Take 40 mg by mouth two (2) times a day.  cholecalciferol, vitamin D3, (VITAMIN D3) 2,000 unit tab Take  by mouth. No current facility-administered medications for this visit. Allergies: Allergies   Allergen Reactions    Aspirin Anaphylaxis    Peanut Anaphylaxis    Other Plant, Animal, Environmental Runny Nose and Sneezing     Hayfever         Review of Systems  A comprehensive review of systems was negative except for that written in the HPI.     Objective:     Exam:    Visit Vitals    /84 (BP 1 Location: Right arm, BP Patient Position: Sitting)    Pulse 71    Resp 20    Ht 5' 7\" (1.702 m)    Wt (!) 160.4 kg (353 lb 9.6 oz)    LMP 12/03/2013    SpO2 100%    BMI 55.38 kg/m2 General appearance: alert, cooperative, no distress, appears stated age  Eyes: no sclera icterus  Lungs: clear to auscultation bilaterally  Heart: regular rate and rhythm  Abdomen: soft, non-tender. Non-distended. Extremities: extremities normal, atraumatic, no cyanosis or edema. [place]. Skin: Skin color, texture, turgor normal. No rashes or lesions. No jaundice. Neurologic: Grossly normal    Assessment:     Gallstones  Morbid obesity (BMI 55)    Plan:     Laparoscopic cholecystectomy/IOC. Risks, benefit, and alternative to surgery was discussed with the patient. The risks of surgery include but not limited to infection, bleeding, intraabdominal organ injury, bile duct injury, retained stone, bile leak, possible conversion to open, and the risks of general anesthetic. Patient is agreeable to surgery. All questions answered.

## 2018-06-29 NOTE — INTERVAL H&P NOTE
H&P Update:  Francisco Monique was seen and examined. History and physical has been reviewed. The patient has been examined.  There have been no significant clinical changes since the completion of the originally dated History and Physical.    Signed By: Jasmine Serrato MD     June 29, 2018 9:18 AM

## 2018-06-29 NOTE — OP NOTES
Patient ID:   Name: Coralie Cogan Record Number: [id number]   YOB: 1970    Date of Surgery: 6/29/2018      Preoperative Diagnosis:   Gallstones    Postoperative Diagnosis:    1. Gallstones  2. Incisional hernia    Procedures:   1. Laparoscopic cholecystectomy with intraoperative cholangiogram  2. Laparoscopic incisional hernia repair    Surgeon: Talia Keane MD      Anesthesia: General    Findings:   1. No filling defect within the common bile duct  2. Supraumbilical incisional hernia with 1.5 cm fascial defect. Estimated Blood Loss:  20cc    Specimens:   gallbladder    Indications:  Patient 50 y.o.  female was seen in ER on 6/19/18 with an acute onset of severe achy epigastric pain radiating to the RUQ and back with nausea and vomiting. Patient has had similar symptoms on and off for about 1 year usually 1-2 hours after eating fatty foods. Patient underwent US in the ER which showed cholelithiasis with pericholecystic fluid and tenderness. Patient presents today for cholecystectomy.         Procedure Details: The patient was seen in the Holding Room. The risks, benefits, complications, treatment options, and expected outcomes were discussed with the patient. After informed consent was performed, patient was taken to the operating room and was placed supine in the operating table. After establishing general anesthesia, abdomen was prepped and draped in standard surgical fashion. Small right subcostal incision was made and a 5 mm blunt trocar was placed under direct vision. CO2 pneumoperitoneum was then created. Abdomen was examined. Patient noted to have small supraumbilical fascial defect measuring approximately 1.5 cm over the scar from prior surgery. A 12 mm blunt trocar was placed through this defect and a 5 mm trocar was placed in the epigastric area. Patient was found to have adhesions along the right lateral inferior edge of the gallbladder.   This was taken down carefully. Due to large size of the liver, additional 5 mm trocar was placed in the right upper quadrant for better exposure. The base of the gallbladder was carefully dissected. Cystic duct and artery were then identified and isolated. Three clips were applied in the cystic artery and divided. A clip was applied at the base of the gallbladder and small incision was made within the cystic duct for the cholangiogram.  Cholangiogram catheter was placed and cholangiogram was performed. No filling defect was noted within the common bile duct. Cholangiogram catheter was removed and 3 clips were applied to the cystic duct on the patient side. Cystic duct was divided. Posterior branches of cystic artery were identified  and isolated. Posterior branches of the cystic artery were then clipped and divided. Gallbladder was then removed off of the gallbladder fossa using electrocautery. Gallbladder was retrieved using the Endocatch bag through the periumbilical port site. Abdomen was irrigated. Good hemostasis was obtained. Surgicel was placed in the gallbladder fossa. Fascial defect in the supraumbilical area was closed with figure of eight 0-0 Ethibond using Endoclose. Trocars were removed. CO2 pneumoperitoneum was released. Skin was then approximated using 4-0 Vicryl subcuticular stitch followed by Dermabond. Instrument, sponge, and needle counts were correct prior to closure and at the conclusion of the case. The patient was taken to recovery room in good condition having tolerated the procedure well.       CC:  Tessie Caceres MD

## 2018-06-29 NOTE — PERIOP NOTES
TRANSFER - OUT REPORT:    Verbal report given to Berto Zhu RN (name) on Shantal Mcgovern  being transferred to phase2(unit) for routine post - op       Report consisted of patients Situation, Background, Assessment and   Recommendations(SBAR). Information from the following report(s) SBAR, Kardex, OR Summary, Intake/Output and MAR was reviewed with the receiving nurse. Opportunity for questions and clarification was provided.       Patient transported with:   Registered Nurse

## 2018-06-29 NOTE — PERIOP NOTES
Patient complaining of nausea, states she feels like she is going to vomit.  Discussed with Dr. Kody Avelar via telephone, telephone order received for Compazine 5mg IV X1

## 2018-07-09 ENCOUNTER — OFFICE VISIT (OUTPATIENT)
Dept: SURGERY | Age: 48
End: 2018-07-09

## 2018-07-09 VITALS
HEART RATE: 70 BPM | DIASTOLIC BLOOD PRESSURE: 70 MMHG | WEIGHT: 293 LBS | OXYGEN SATURATION: 100 % | BODY MASS INDEX: 45.99 KG/M2 | TEMPERATURE: 97.8 F | RESPIRATION RATE: 16 BRPM | SYSTOLIC BLOOD PRESSURE: 124 MMHG | HEIGHT: 67 IN

## 2018-07-09 DIAGNOSIS — Z09 POSTOPERATIVE EXAMINATION: Primary | ICD-10-CM

## 2018-07-09 NOTE — PROGRESS NOTES
Patient is here for follow-up. Patient underwent Lap ilda/IOC and incisional hernia repair on 6/29/18. Doing well other than incisional pain. Taking pain medication at night, but during the day taking NSAIDs. Had bouts of diarrhea after eating pizza, otherwise doing well. Path reviewed with the patient. PE:  Visit Vitals    /70 (BP 1 Location: Left arm, BP Patient Position: Sitting)    Pulse 70    Temp 97.8 °F (36.6 °C) (Oral)    Resp 16    Ht 5' 7\" (1.702 m)    Wt 158 kg (348 lb 6.4 oz)    LMP 12/03/2013    SpO2 100%    BMI 54.57 kg/m2     Abdomen:  Soft, [employer] C/D/I.     Assessment:  S/p Lap ilda/IOC and incisional hernia repair    Plan:  -f/u prn

## 2018-07-09 NOTE — LETTER
NOTIFICATION RETURN TO WORK  
 
 
 
 
 
7/9/2018 3:10 PM 
 
Ms. Cullen Olivarez [address] [id number] To Whom It May Concern: 
 
Cullen Olivarez is currently under the care of [address]. She will return to work on: 7/16/2018 , full duty and normal activities . If there are questions or concerns please have the patient contact our office. Sincerely, Arthur Alfredo MD

## 2018-07-09 NOTE — MR AVS SNAPSHOT
[address], [address] [facility], [address] 
[phone] Patient: Shawanda Pérez MRN: [id number] Visit Information Date & Time Provider Department Dept. Phone Encounter #  
 2018  2:20 PM Kimberly Clark MD [facility] [place] [address] [id number] Your Appointments 2018  9:30 AM  
ESTABLISHED PATIENT with Shamar Junior MD  
[address] ([address]) Appt Note: 6 month f/up  
 [address] [facility] [address]  
[phone]  
  
   
 [address] [facility] [address] Upcoming Health Maintenance Date Due  
 BREAST CANCER SCRN MAMMOGRAM 2016 PAP AKA CERVICAL CYTOLOGY 3/2/2018 Influenza Age 5 to Adult 2018 DTaP/Tdap/Td series (2 - Td) 10/20/2024 COLONOSCOPY 2027 Allergies as of 2018  Review Complete On: 2018 By: Kimberly Clark MD  
  
 Severity Noted Reaction Type Reactions Aspirin High 2015    Anaphylaxis Peanut High 2015    Anaphylaxis Other Plant, Animal, Environmental  2015    Runny Nose, Sneezing Hayfever Current Immunizations  Reviewed on 2015 Name Date Influenza Vaccine 10/20/2014 MMR 1989 TB Skin Test (PPD) 1989 Tdap 10/20/2014 Not reviewed this visit You Were Diagnosed With   
  
 Codes Comments Postoperative examination    -  Primary ICD-10-CM: K53 ICD-9-CM: V67.00 Vitals BP Pulse Temp Resp Height(growth percentile) Weight(growth percentile) 124/70 (BP 1 Location: Left arm, BP Patient Position: Sitting) 70 97.8 °F (36.6 °C) (Oral) 16 5' 7\" (1.702 m) 348 lb 6.4 oz (158 kg) LMP SpO2 BMI OB Status Smoking Status 2013 100% 54.57 kg/m2 Hysterectomy Never Smoker BMI and BSA Data Body Mass Index Body Surface Area  54.57 kg/m 2 2.73 m 2  
  
  
 Preferred Pharmacy Pharmacy Name Phone [facility] [id number] - [address], [address] AT [place] [address] [phone] Your Updated Medication List  
  
   
This list is accurate as of 7/9/18  4:17 PM.  Always use your most recent med list.  
  
  
  
  
 buPROPion  mg tablet Commonly known as:  WELLBUTRIN XL  
TAKE 1 TABLET BY MOUTH EVERY MORNING  
  
 citalopram 40 mg tablet Commonly known as:  CELEXA  
TAKE 1 TABLET BY MOUTH EVERY DAY  
  
 * HYDROcodone-acetaminophen 5-325 mg per tablet Commonly known as:  Annamary Uyen Take 1 Tab by mouth every four (4) hours as needed for Pain. Max Daily Amount: 6 Tabs. * HYDROcodone-acetaminophen 5-325 mg per tablet Commonly known as:  Annamary Uyen Take 1-2 Tabs by mouth every four (4) hours as needed for Pain. Max Daily Amount: 12 Tabs. IMURAN 50 mg tablet Generic drug:  azaTHIOprine Take 50 mg by mouth three (3) times daily. 100 mg in am, 50 mg evening  Indications: Systemic Lupus Erythematosus  
  
 levothyroxine 150 mcg tablet Commonly known as:  SYNTHROID Take 1 Tab by mouth Daily (before breakfast). Needs labs  
  
 omeprazole 40 mg capsule Commonly known as:  PRILOSEC Take 40 mg by mouth two (2) times a day. ondansetron 4 mg disintegrating tablet Commonly known as:  ZOFRAN ODT Take 1 Tab by mouth every eight (8) hours as needed for Nausea. ondansetron hcl 4 mg tablet Commonly known as:  Doni Lose Take 1 Tab by mouth every eight (8) hours as needed for Nausea. pantoprazole 40 mg tablet Commonly known as:  PROTONIX Take 40 mg by mouth two (2) times a day. PLAQUENIL 200 mg tablet Generic drug:  hydroxychloroquine Take 200 mg by mouth two (2) times a day. VITAMIN D3 2,000 unit Tab Generic drug:  cholecalciferol (vitamin D3) Take  by mouth. XIIDRA 5 % Dpet Generic drug:  lifitegrast  
INSTILL 1 DROP INTO BOTH EYES BID  
  
 * Notice: This list has 2 medication(s) that are the same as other medications prescribed for you. Read the directions carefully, and ask your doctor or other care provider to review them with you. Introducing [facility] SERVICES! Dear Estefanía Glover: Thank you for requesting a Navarik account. Our records indicate that you already have an active Navarik account. You can access your account anytime at [email or web address] Did you know that you can access your hospital and ER discharge instructions at any time in Navarik? You can also review all of your test results from your hospital stay or ER visit. Additional Information If you have questions, please visit the Frequently Asked Questions section of the Navarik website at [email or web address] Remember, Navarik is NOT to be used for urgent needs. For medical emergencies, dial 911. Now available from your iPhone and Android! Please provide this summary of care documentation to your next provider. Your primary care clinician is listed as Alexis Oneill. If you have any questions after today's visit, please call [phone].

## 2018-07-09 NOTE — PROGRESS NOTES
Bhargavi Luna is a 50 y.o. female     Chief Complaint   Patient presents with    Surgical Follow-up     p/o Lap ilda 6/29/18       Visit Vitals    /70 (BP 1 Location: Left arm, BP Patient Position: Sitting)    Pulse 70    Temp 97.8 °F (36.6 °C) (Oral)    Resp 16    Ht 5' 7\" (1.702 m)    Wt 348 lb 6.4 oz (158 kg)    LMP 12/03/2013    SpO2 100%    BMI 54.57 kg/m2       Health Maintenance Due   Topic Date Due    BREAST CANCER SCRN MAMMOGRAM  04/13/2016    PAP AKA CERVICAL CYTOLOGY  03/02/2018       1. Have you been to the ER, urgent care clinic since your last visit? Hospitalized since your last visit? No    2. Have you seen or consulted any other health care providers outside of the [address] [place] [address] since your last visit? Include any pap smears or colon screening.  No

## 2018-08-06 ENCOUNTER — TELEPHONE (OUTPATIENT)
Dept: RHEUMATOLOGY | Age: 48
End: 2018-08-06

## 2018-08-06 NOTE — TELEPHONE ENCOUNTER
----- Message from Dotty Will sent at 8/6/2018  9:26 AM EDT -----  Regarding: Dr. Colin/ Telephone  Patient would like to get a prescription for her Montgomery Yumiko called to [address] [place] [address], [phone] and also needs a refill of her Azathioprine called to [address].  Contact is [phone]

## 2018-08-09 RX ORDER — AZATHIOPRINE 50 MG/1
50 TABLET ORAL 3 TIMES DAILY
Qty: 90 TAB | Refills: 6 | Status: SHIPPED | OUTPATIENT
Start: 2018-08-09 | End: 2018-08-17 | Stop reason: SDUPTHER

## 2018-08-13 ENCOUNTER — TELEPHONE (OUTPATIENT)
Dept: RHEUMATOLOGY | Age: 48
End: 2018-08-13

## 2018-08-13 NOTE — TELEPHONE ENCOUNTER
----- Message from Aden Vazquez sent at 8/13/2018  3:57 PM EDT -----  Regarding: Dr. Genaro Nelson Pt is requesting a refill for \"Azathioprine 150mg\" to be sent to [facility] ([phone]). Please call upon completion. Best contact [phone].

## 2018-08-17 ENCOUNTER — TELEPHONE (OUTPATIENT)
Dept: RHEUMATOLOGY | Age: 48
End: 2018-08-17

## 2018-08-17 RX ORDER — AZATHIOPRINE 50 MG/1
50 TABLET ORAL 3 TIMES DAILY
Qty: 90 TAB | Refills: 6 | Status: SHIPPED | OUTPATIENT
Start: 2018-08-17 | End: 2019-03-25 | Stop reason: SDUPTHER

## 2018-08-17 NOTE — TELEPHONE ENCOUNTER
Spoke to pt  informed pt  that the medication has been sent over to [facility], pt  verbally acknowledged understanding

## 2018-08-17 NOTE — TELEPHONE ENCOUNTER
Patients  called because wife is out of her medication for Imuran. Tracy request the refill be sent to [facility] and as soon as possible today before 12 because she has been out of meds for a few days. Call back number is [phone].

## 2018-08-20 ENCOUNTER — TELEPHONE (OUTPATIENT)
Dept: RHEUMATOLOGY | Age: 48
End: 2018-08-20

## 2018-08-20 NOTE — TELEPHONE ENCOUNTER
----- Message from Chapincito Penny sent at 8/17/2018  2:13 PM EDT -----  Regarding: Dr. Jn Boswell / Rah Holt ([address] [facility]) is requesting a call back from the practice in reference to patient medication(unknown). Stated that pt has called looking for medication that the pharmacy hasn't received.  [facility] best contact [phone]

## 2018-08-20 NOTE — TELEPHONE ENCOUNTER
Spoke to pt informed pt that the refill script that her  requested was sent to [address] [place] [address] as requested, pt verbally acknowledged understanding

## 2018-09-28 ENCOUNTER — TELEPHONE (OUTPATIENT)
Dept: RHEUMATOLOGY | Age: 48
End: 2018-09-28

## 2018-09-28 RX ORDER — PREDNISONE 5 MG/1
TABLET ORAL
Qty: 30 TAB | Refills: 1 | Status: SHIPPED | OUTPATIENT
Start: 2018-09-28 | End: 2018-09-28 | Stop reason: SDUPTHER

## 2018-09-28 RX ORDER — PREDNISONE 5 MG/1
TABLET ORAL
Qty: 30 TAB | Refills: 1 | Status: SHIPPED | OUTPATIENT
Start: 2018-09-28 | End: 2019-03-25 | Stop reason: ALTCHOICE

## 2018-11-26 ENCOUNTER — OFFICE VISIT (OUTPATIENT)
Dept: RHEUMATOLOGY | Age: 48
End: 2018-11-26

## 2018-11-26 VITALS
WEIGHT: 293 LBS | SYSTOLIC BLOOD PRESSURE: 125 MMHG | OXYGEN SATURATION: 97 % | DIASTOLIC BLOOD PRESSURE: 84 MMHG | BODY MASS INDEX: 53.75 KG/M2 | TEMPERATURE: 97.7 F | HEART RATE: 74 BPM | RESPIRATION RATE: 16 BRPM

## 2018-11-26 DIAGNOSIS — L93.1 SUBACUTE CUTANEOUS LUPUS ERYTHEMATOSUS: Primary | ICD-10-CM

## 2018-11-26 NOTE — PROGRESS NOTES
RHEUMATOLOGY PROBLEM LIST AND CHIEF COMPLAINT  1. Lupus (1999) -  fatigue, arthralgia, rash (livedo reticularis), pleurisy, interstitial changes however no interstitial lung disease, proteinuria, fevers, dry eyes, dry mouth and Raynaud's phenomenon, SHREYA, SSA, SSB and double-stranded DNA. Therapy History:  Prior DMARDs: Cellcept (approx. 6 months, stopped because of size of pill)  Current DMARDs: Imuran ([id number], restarted 09/2016-current), Hydroxychloroquine (stopped 10/2016, 1/2017-current)    2. Sjogrens syndrome - dry eyes, dry mouth and Raynaud's phenomenon    INTERVAL HISTORY  This is a 46 y.o.  female. Today, the patient complains of pain in the joints. Location: hip  Severity:  3 on a scale of 0-10  Timing: morning, night  Duration:  6 months  Modifying Factors: Death of father  Context/Associated signs and symptoms: Patient had a recent episode of pleurisy, resolved with steroids. She continues with symptomatic treatment, Plaquenil 400 mg daily, and Imuran 150 mg daily. She states the Plaquenil pill cost is too high for her to afford. She complains of hip pain early in the morning and at night. She requests a work permit to have a space heater in her office to avoid Raynaud's flares. RHEUMATOLOGY REVIEW OF SYSTEMS   Positives as per history  Negatives as follows:  Adra Maycol:  Denies unexplained persistent fevers or weight change  RESPIRATORY:  No pleuritic pain, exertional dyspnea  CARDIOVASCULAR:  Denies chest pain  GASTRO:   Denies heartburn, abdominal pain, nausea, vomiting, diarrhea  SKIN:    Denies rash   MSK:    No morning stiffness >1 hour, persistent joint swelling, persistent joint pain    PAST MEDICAL, SOCIAL AND FAMILY HISTORY  Reviewed with patient, significant changes in medical history - yes, gallbladder removal & recent death of father    PHYSICAL EXAM  Blood pressure 125/84, pulse 74, temperature 97.7 °F (36.5 °C), temperature source Oral, resp.  rate 16, weight 343 lb 3.2 oz (155.7 kg), last menstrual period 12/03/2013, SpO2 97 %. GENERAL APPEARANCE: Well-nourished, no acute distress  NECK: No adenopathy  ENT: No oral ulcers  CARDIOVASCULAR: Heart rhythm is regular. No murmur, rub, gallop  CHEST: Normal vesicular breath sounds. No wheezes, rales, pleural friction rubs  ABDOMINAL: The abdomen is soft and nontender. Bowel sounds are normal  SKIN: No rash, palpable purpura, digital ulcer, abnormal thickening   MUSCULOSKELETAL:   Upper extremities - full range of motion, no tenderness, no swelling, no synovial thickening and no deformity of joints  Lower extremities - bony prominence of bilateral knees with no tenderness on range of motion    LABS, RADIOLOGY AND PROCEDURES  Previous labs reviewed -Yes    ASSESSMENT  1. Lupus (Established problem - Stable disease) - Her disease seems well-managed with Imuran 150 mg daily and Plaquenil 400 mg daily. We will decrease her Plaquenil dose to 200 mg daily. I am fine with her holding the medication after she runs out, as it is too expensive for her. I will write her a permit to have a heater in her work office to avoid Raynaud's flares. I will check labs today. She should return in 6 months for a follow up. 2. Sjogren syndrome (Established problem - Stable disease) - the patient's disease is stable. She will use symptomatic treatments for dry mouth. 3. Drug therapy monitoring for toxicity (plaquenil ) - CBC, BUN, Cr, AST, ALT and albumin every 3 months; eye exams every 6-12 months for retinal toxicity. PLAN  1. Taper Hydroxychloroquine to 200 mg daily  2. Imuran 150 mg daily  3. Lupus disease activity labs - CBC, CMP, ESR, CRP, DSDNDA, complements, UA, urine protein/creatinine   4. Check Vitamin D  5. Permit for heater in office  6. Return in 6 months    Aarat M. Verla Holstein, MD  Adult and Pediatric Rheumatology     [address] [address], Phone [phone], Fax [phone]   E-mail: [email or web address]    Visiting  of Pediatrics    Department of Pediatrics, [facility], [address] [place] [address], Phone [phone], Fax [phone]  E-mail: [email or web address]    There are no Patient Instructions on file for this visit. cc:  Ray Graham MD    Written by zulema Howard, as dictated by Theresa Johnson.  Harriet Louie M.D.

## 2018-11-26 NOTE — LETTER
11/26/18 To whom it may concern,  
 
Jam Muse is currently battling a disease called Systemic Lupus Erythematosus (SLE)  SLE is a rare autoimmune disease which attacks people of all ages, causing them to suffer from painful joints, hair loss, oral ulcers, weakened and painful muscles, skin rash that worsens with sun exposure, severe fatigue, kidney disease and other debilitating symptoms. Some children may experience a remission, while others will buckley SLE their entire life. Currently, this patient is receiving medications that suppress the immune system and help control this disease. These medications may help control the disease however we cannot predict when and if the disease will flare. Some patients have raynaud's and will benefit from staying warm and keeping the core body warm. I would like her work to accommodate the following. ? Space heater to stay warm and prevent raynaud's flare Please call me with any concerns or questions Sincerely, Stanislaw Luz MD 
Adult and Pediatric Rheumatology [address], Phone [phone], Fax [phone] E-mail: Tomi Jain  of Pediatrics Department of Pediatrics, [address], [address] [place] [address], Phone [phone], Fax [phone] E-mail: [email or web address]

## 2018-11-30 LAB
25(OH)D2 SERPL-MCNC: 1.7 NG/ML
25(OH)D3 SERPL-MCNC: 30 NG/ML
25(OH)D3+25(OH)D2 SERPL-MCNC: 32 NG/ML
ALBUMIN SERPL-MCNC: 4.2 G/DL (ref 3.5–5.5)
ALBUMIN/GLOB SERPL: 1.6 {RATIO} (ref 1.2–2.2)
ALP SERPL-CCNC: 86 IU/L (ref 39–117)
ALT SERPL-CCNC: 16 IU/L (ref 0–32)
AST SERPL-CCNC: 15 IU/L (ref 0–40)
BASOPHILS # BLD MANUAL: 0 X10E3/UL (ref 0–0.2)
BASOPHILS NFR BLD MANUAL: 0 %
BILIRUB SERPL-MCNC: 0.2 MG/DL (ref 0–1.2)
BUN SERPL-MCNC: 13 MG/DL (ref 6–24)
BUN/CREAT SERPL: 19 (ref 9–23)
C3 SERPL-MCNC: 182 MG/DL (ref 82–167)
C4 SERPL-MCNC: 24 MG/DL (ref 14–44)
CALCIUM SERPL-MCNC: 9.3 MG/DL (ref 8.7–10.2)
CHLORIDE SERPL-SCNC: 105 MMOL/L (ref 96–106)
CO2 SERPL-SCNC: 23 MMOL/L (ref 20–29)
CREAT SERPL-MCNC: 0.69 MG/DL (ref 0.57–1)
CRP SERPL-MCNC: 13.8 MG/L (ref 0–4.9)
DIFFERENTIAL COMMENT, 115260: ABNORMAL
EOSINOPHIL # BLD MANUAL: 0.1 X10E3/UL (ref 0–0.4)
EOSINOPHIL NFR BLD MANUAL: 1 %
ERYTHROCYTE [DISTWIDTH] IN BLOOD BY AUTOMATED COUNT: 16.9 % (ref 12.3–15.4)
ERYTHROCYTE [SEDIMENTATION RATE] IN BLOOD BY WESTERGREN METHOD: 41 MM/HR (ref 0–40)
GLOBULIN SER CALC-MCNC: 2.6 G/DL (ref 1.5–4.5)
GLUCOSE SERPL-MCNC: 106 MG/DL (ref 65–99)
HCT VFR BLD AUTO: 39.5 % (ref 34–46.6)
HGB BLD-MCNC: 12.2 G/DL (ref 11.1–15.9)
LYMPHOCYTES # BLD MANUAL: 1.2 X10E3/UL (ref 0.7–3.1)
LYMPHOCYTES NFR BLD MANUAL: 17 %
MCH RBC QN AUTO: 26.3 PG (ref 26.6–33)
MCHC RBC AUTO-ENTMCNC: 30.9 G/DL (ref 31.5–35.7)
MCV RBC AUTO: 85 FL (ref 79–97)
MONOCYTES # BLD MANUAL: 0.3 X10E3/UL (ref 0.1–0.9)
MONOCYTES NFR BLD MANUAL: 4 %
NEUTROPHILS # BLD MANUAL: 5.7 X10E3/UL (ref 1.4–7)
NEUTROPHILS NFR BLD MANUAL: 78 %
PLATELET # BLD AUTO: 326 X10E3/UL (ref 150–379)
PLATELET BLD QL SMEAR: ADEQUATE
POTASSIUM SERPL-SCNC: 4.3 MMOL/L (ref 3.5–5.2)
PROT SERPL-MCNC: 6.8 G/DL (ref 6–8.5)
RBC # BLD AUTO: 4.63 X10E6/UL (ref 3.77–5.28)
RBC MORPH BLD: ABNORMAL
SODIUM SERPL-SCNC: 141 MMOL/L (ref 134–144)
WBC # BLD AUTO: 7.3 X10E3/UL (ref 3.4–10.8)

## 2019-03-25 ENCOUNTER — OFFICE VISIT (OUTPATIENT)
Dept: RHEUMATOLOGY | Age: 49
End: 2019-03-25

## 2019-03-25 VITALS
TEMPERATURE: 97.6 F | HEART RATE: 66 BPM | SYSTOLIC BLOOD PRESSURE: 124 MMHG | BODY MASS INDEX: 53.16 KG/M2 | DIASTOLIC BLOOD PRESSURE: 74 MMHG | OXYGEN SATURATION: 97 % | WEIGHT: 293 LBS | RESPIRATION RATE: 16 BRPM

## 2019-03-25 DIAGNOSIS — L93.1 SUBACUTE CUTANEOUS LUPUS ERYTHEMATOSUS: Primary | ICD-10-CM

## 2019-03-25 RX ORDER — AZATHIOPRINE 50 MG/1
50 TABLET ORAL 3 TIMES DAILY
Qty: 270 TAB | Refills: 1 | Status: SHIPPED | OUTPATIENT
Start: 2019-03-25 | End: 2019-04-28 | Stop reason: SDUPTHER

## 2019-03-25 NOTE — PROGRESS NOTES
Chief Complaint   Patient presents with    Joint Pain     1. Have you been to the ER, urgent care clinic since your last visit? Hospitalized since your last visit? No    2. Have you seen or consulted any other health care providers outside of the [address] [place] [address] since your last visit? Include any pap smears or colon screening.  No

## 2019-03-25 NOTE — PROGRESS NOTES
RHEUMATOLOGY PROBLEM LIST AND CHIEF COMPLAINT  1. Lupus (1999) -  fatigue, arthralgia, rash (livedo reticularis), pleurisy, interstitial changes however no interstitial lung disease, proteinuria, fevers, dry eyes, dry mouth and Raynaud's phenomenon, SHREYA, SSA, SSB and double-stranded DNA. Therapy History:  Prior DMARDs: Cellcept (approx. 6 months, stopped because of size of pill),  Hydroxychloroquine (stopped 10/2016, 1/[id number])  Current DMARDs: Imuran ([id number], restarted 09/2016-current)    2. Sjogrens syndrome - dry eyes, dry mouth and Raynaud's phenomenon    INTERVAL HISTORY  This is a 46 y.o.  female. Today, the patient complains of pain in the joints. Location: hand  Severity:  2 on a scale of 0-10  Timing:  All day  Duration:  4 months  Modifying Factors:   Context/Associated signs and symptoms: The patient is doing well today. She continues with Imuran 150 mg daily. She stopped Plaquenil because this medication is too expensive. Her worst complaint is intermittent stiffness in the hands. We reviewed her most recent labs, which were normal. She stopped Corbin Leep because it did not improve her eye dryness.     RHEUMATOLOGY REVIEW OF SYSTEMS   Positives as per history  Negatives as follows:  Coralyn Lacrosse:  Denies unexplained persistent fevers, weight change, chronic fatigue  HEAD/EYES:   Denies eye redness, blurry vision or sudden loss of vision, HA, temporal artery pain  ENT:    Denies oral/nasal ulcers, recurrent sinus infections,   RESPIRATORY:  No pleuritic pain, history of pleural effusions, hemoptysis, exertional dyspnea  CARDIOVASCULAR:  Denies chest pain, history of pericardial effusions  GASTRO:   Denies heartburn, esophageal dysmotility, abdominal pain, nausea, vomiting, diarrhea, blood in the stool  HEMATOLOGIC:  No easy bruising, purpura, swollen lymph nodes  SKIN:    Denies alopecia, ulcers, nodules, sun sensitivity, unexplained persistent rash   VASCULAR:   Denies edema, cyanosis, raynaud phenomenon  NEUROLOGIC:  Denies specific muscle weakness, paresthesias   PSYCHIATRIC:  No sleep disturbance / snoring, depression, anxiety  MSK:    No morning stiffness >1 hour, SI joint pain, persistent joint swelling, persistent joint pain    PAST MEDICAL HISTORY  Reviewed with patient, significant changes in medical history - no    PHYSICAL EXAM  Blood pressure 124/74, pulse 66, temperature 97.6 °F (36.4 °C), temperature source Oral, resp. rate 16, weight 339 lb 6.4 oz (154 kg), last menstrual period 12/03/2013, SpO2 97 %. GENERAL APPEARANCE: Well-nourished, no acute distress  NECK: No adenopathy  ENT: No oral ulcers  CARDIOVASCULAR: Heart rhythm is regular. No murmur, rub, gallop  CHEST: Normal vesicular breath sounds. No wheezes, rales, pleural friction rubs  ABDOMINAL: The abdomen is soft and nontender. Bowel sounds are normal  SKIN: No rash, palpable purpura, digital ulcer, abnormal thickening   MUSCULOSKELETAL:   Upper extremities - full range of motion, no tenderness, no swelling, no synovial thickening and no deformity of joints  Lower extremities - bony prominence of bilateral knees with no tenderness on range of motion    LABS, RADIOLOGY AND PROCEDURES  Previous labs reviewed -Yes    ASSESSMENT  1. Lupus (Established problem - Stable disease) - The patient's disease is currently stable. We will continue with Imuran 150 mg daily. We will continue to hold Plaquenil. She should return in 4 months for a follow up. 2. Sjogren syndrome (Established problem - Stable disease) - The patient's disease is stable. She will use symptomatic treatments for dry eyes & dry mouth. PLAN  1. Imuran 150 mg daily  2. Return in 4 months    Stanislaw Olivarez MD  Adult and Pediatric Rheumatology     [address], Phone [phone], Fax [phone]   E-mail: [email or web address]    Visiting  of Pediatrics    Department of Pediatrics, [address], Phone [phone], Fax [phone]  E-mail: [email or web address]    There are no Patient Instructions on file for this visit. cc:  Mariano Bryant MD    Written by zulema Ling, as dictated by Ilan Osborn.  Mitra Coffey M.D.

## 2019-07-29 ENCOUNTER — OFFICE VISIT (OUTPATIENT)
Dept: RHEUMATOLOGY | Age: 49
End: 2019-07-29

## 2019-07-29 VITALS
DIASTOLIC BLOOD PRESSURE: 79 MMHG | BODY MASS INDEX: 45.99 KG/M2 | SYSTOLIC BLOOD PRESSURE: 125 MMHG | OXYGEN SATURATION: 97 % | HEART RATE: 72 BPM | RESPIRATION RATE: 18 BRPM | HEIGHT: 67 IN | TEMPERATURE: 97.7 F | WEIGHT: 293 LBS

## 2019-07-29 DIAGNOSIS — E55.9 VITAMIN D DEFICIENCY: ICD-10-CM

## 2019-07-29 DIAGNOSIS — L93.1 SUBACUTE CUTANEOUS LUPUS ERYTHEMATOSUS: Primary | ICD-10-CM

## 2019-07-29 RX ORDER — AZATHIOPRINE 50 MG/1
TABLET ORAL
Qty: 270 TAB | Refills: 1 | Status: SHIPPED | OUTPATIENT
Start: 2019-07-29 | End: 2020-12-14 | Stop reason: ALTCHOICE

## 2019-07-29 NOTE — PROGRESS NOTES
RHEUMATOLOGY PROBLEM LIST AND CHIEF COMPLAINT  1. Lupus (1999) -  fatigue, arthralgia, rash (livedo reticularis), pleurisy, interstitial changes however no interstitial lung disease, proteinuria, fevers, dry eyes, dry mouth and Raynaud's phenomenon, SHREYA, SSA, SSB and double-stranded DNA. Therapy History:  Prior DMARDs: Cellcept (approx. 6 months, stopped because of size of pill),  Hydroxychloroquine (stopped 10/2016, 1/[id number])  Current DMARDs: Imuran (4817-0637, restarted 09/2016-current)    2. Sjogrens syndrome - dry eyes, dry mouth and Raynaud's phenomenon    INTERVAL HISTORY  This is a 46 y.o.  female. Today, the patient complains of pain in the joints. Location: leg  Severity:  0 on a scale of 0-10  Timing:  All day  Duration:  4 months  Modifying Factors: NA  Context/Associated signs and symptoms: The patient is doing well today. She continues with Imuran 150 mg daily. She denies chest pain, pleurisy, or rash. She has not required Prednisone use. She reports some leg pain secondary to a recent fall.      RHEUMATOLOGY REVIEW OF SYSTEMS   Positives as per history  Negatives as follows:  Almas Harder:  Denies unexplained persistent fevers, weight change, chronic fatigue  HEAD/EYES:   Denies eye redness, blurry vision or sudden loss of vision, HA, temporal artery pain  ENT:    Denies oral/nasal ulcers, recurrent sinus infections,   RESPIRATORY:  No pleuritic pain, history of pleural effusions, hemoptysis, exertional dyspnea  CARDIOVASCULAR:  Denies chest pain, history of pericardial effusions  GASTRO:   Denies heartburn, esophageal dysmotility, abdominal pain, nausea, vomiting, diarrhea, blood in the stool  HEMATOLOGIC:  No easy bruising, purpura, swollen lymph nodes  SKIN:    Denies alopecia, ulcers, nodules, sun sensitivity, unexplained persistent rash   VASCULAR:   Denies edema, cyanosis, raynaud phenomenon  NEUROLOGIC:  Denies specific muscle weakness, paresthesias   PSYCHIATRIC:  No sleep disturbance / snoring, depression, anxiety  MSK:    No morning stiffness >1 hour, SI joint pain, persistent joint swelling, persistent joint pain    PAST MEDICAL HISTORY  Reviewed with patient, significant changes in medical history - no    PHYSICAL EXAM  Blood pressure 125/79, pulse 72, temperature 97.7 °F (36.5 °C), temperature source Oral, resp. rate 18, height 5' 7\" (1.702 m), weight 340 lb (154.2 kg), last menstrual period 12/03/2013, SpO2 97 %. GENERAL APPEARANCE: Well-nourished, no acute distress  NECK: No adenopathy  ENT: No oral ulcers  CARDIOVASCULAR: Heart rhythm is regular. No murmur, rub, gallop  CHEST: Normal vesicular breath sounds. No wheezes, rales, pleural friction rubs  ABDOMINAL: The abdomen is soft and nontender. Bowel sounds are normal  SKIN: No rash, palpable purpura, digital ulcer, abnormal thickening   MUSCULOSKELETAL:   Upper extremities - full range of motion, no tenderness, no swelling, no synovial thickening and no deformity of joints  Lower extremities - bony prominence of bilateral knees with no tenderness on range of motion    LABS, RADIOLOGY AND PROCEDURES  Previous labs reviewed -Yes    ASSESSMENT  1. Lupus (Established problem - Stable disease) - The patient's disease is currently stable. We will continue with Imuran 150 mg daily. She should return in 4 months for a follow up. I will order labs today. 2. Sjogren syndrome (Established problem - Stable disease) - The patient's disease is stable. She will use symptomatic treatments for dry eyes & dry mouth. 3. Drug therapy monitoring for toxicity (Imuran) - CBC, BUN, Cr, AST, ALT and albumin every 4 months    PLAN  1. Imuran 150 mg daily  2. Lupus disease activity labs - CBC, CMP, ESR, CRP, DSDNDA, complements, UA, urine protein/creatinine   3. Return in 4 months    Stanislaw Garcia MD  Adult and Pediatric Rheumatology     [facility]  [address] [place] [address], [address] [facility], [address] [facility], Phone [phone], Fax [phone]   E-mail: [email or web address]    Visiting  of Pediatrics    Department of Pediatrics, [facility] of [address] [place] [address], [address] [place] [address], Phone [phone], Fax [phone]  E-mail: [email or web address]    There are no Patient Instructions on file for this visit. cc:  Stalin Christianson MD    Written by zulema Hilton, as dictated by Alice Pacheco.  Iraida Boo M.D.

## 2019-07-29 NOTE — PROGRESS NOTES
Vipin Torre is a 46 y.o. female     Chief Complaint   Patient presents with    Lupus     4 mponth follow up       Visit Vitals  /79 (BP 1 Location: Right arm, BP Patient Position: Sitting)   Pulse 72   Temp 97.7 °F (36.5 °C) (Oral)   Resp 18   Ht 5' 7\" (1.702 m)   Wt 340 lb (154.2 kg)   LMP 12/03/2013   SpO2 97%   BMI 53.25 kg/m²       Health Maintenance Due   Topic Date Due    BREAST CANCER SCRN MAMMOGRAM  04/13/2016    PAP AKA CERVICAL CYTOLOGY  03/02/2018    Shingrix Vaccine Age 50> (1 of 2) 08/01/2018       1. Have you been to the ER, urgent care clinic since your last visit? Hospitalized since your last visit? No    2. Have you seen or consulted any other health care providers outside of the [address] [place] [address] since your last visit? Include any pap smears or colon screening.  No

## 2019-07-31 RX ORDER — AZATHIOPRINE 50 MG/1
TABLET ORAL
Qty: 270 TAB | Refills: 0 | Status: SHIPPED | OUTPATIENT
Start: 2019-07-31 | End: 2019-11-11 | Stop reason: SDUPTHER

## 2019-08-02 LAB
ALBUMIN SERPL-MCNC: 3.9 G/DL (ref 3.5–5.5)
ALBUMIN/GLOB SERPL: 1.4 {RATIO} (ref 1.2–2.2)
ALP SERPL-CCNC: 89 IU/L (ref 39–117)
ALT SERPL-CCNC: 20 IU/L (ref 0–32)
APPEARANCE UR: ABNORMAL
AST SERPL-CCNC: 14 IU/L (ref 0–40)
BACTERIA #/AREA URNS HPF: ABNORMAL /[HPF]
BACTERIA UR CULT: NORMAL
BASOPHILS # BLD MANUAL: 0.1 X10E3/UL (ref 0–0.2)
BASOPHILS NFR BLD MANUAL: 1 %
BILIRUB SERPL-MCNC: <0.2 MG/DL (ref 0–1.2)
BILIRUB UR QL STRIP: NEGATIVE
BUN SERPL-MCNC: 11 MG/DL (ref 6–24)
BUN/CREAT SERPL: 13 (ref 9–23)
C3 SERPL-MCNC: 165 MG/DL (ref 82–167)
C4 SERPL-MCNC: 18 MG/DL (ref 14–44)
CALCIUM SERPL-MCNC: 8.9 MG/DL (ref 8.7–10.2)
CASTS URNS QL MICRO: ABNORMAL /LPF
CHLORIDE SERPL-SCNC: 105 MMOL/L (ref 96–106)
CO2 SERPL-SCNC: 22 MMOL/L (ref 20–29)
COLOR UR: YELLOW
CREAT SERPL-MCNC: 0.86 MG/DL (ref 0.57–1)
CREAT UR-MCNC: 111.7 MG/DL
CRP SERPL-MCNC: 15 MG/L (ref 0–10)
DIFFERENTIAL COMMENT, 115260: ABNORMAL
DSDNA (NDNA) SCRN BY CRITHIDIA: NORMAL TITER
EOSINOPHIL # BLD MANUAL: 0.1 X10E3/UL (ref 0–0.4)
EOSINOPHIL NFR BLD MANUAL: 1 %
EPI CELLS #/AREA URNS HPF: >10 /HPF (ref 0–10)
ERYTHROCYTE [DISTWIDTH] IN BLOOD BY AUTOMATED COUNT: 15.6 % (ref 12.3–15.4)
ERYTHROCYTE [SEDIMENTATION RATE] IN BLOOD BY WESTERGREN METHOD: 37 MM/HR (ref 0–40)
GLOBULIN SER CALC-MCNC: 2.8 G/DL (ref 1.5–4.5)
GLUCOSE SERPL-MCNC: 132 MG/DL (ref 65–99)
GLUCOSE UR QL: NEGATIVE
HCT VFR BLD AUTO: 37.1 % (ref 34–46.6)
HGB BLD-MCNC: 11.3 G/DL (ref 11.1–15.9)
HGB UR QL STRIP: NEGATIVE
KETONES UR QL STRIP: NEGATIVE
LEUKOCYTE ESTERASE UR QL STRIP: NEGATIVE
LYMPHOCYTES # BLD MANUAL: 0.8 X10E3/UL (ref 0.7–3.1)
LYMPHOCYTES NFR BLD MANUAL: 12 %
MCH RBC QN AUTO: 24.1 PG (ref 26.6–33)
MCHC RBC AUTO-ENTMCNC: 30.5 G/DL (ref 31.5–35.7)
MCV RBC AUTO: 79 FL (ref 79–97)
MICRO URNS: ABNORMAL
MICRO URNS: ABNORMAL
MONOCYTES # BLD MANUAL: 0.3 X10E3/UL (ref 0.1–0.9)
MONOCYTES NFR BLD MANUAL: 4 %
MUCOUS THREADS URNS QL MICRO: PRESENT
NEUTROPHILS # BLD MANUAL: 5.4 X10E3/UL (ref 1.4–7)
NEUTROPHILS NFR BLD MANUAL: 82 %
NITRITE UR QL STRIP: NEGATIVE
PH UR STRIP: 6 [PH] (ref 5–7.5)
PLATELET # BLD AUTO: 321 X10E3/UL (ref 150–450)
PLATELET BLD QL SMEAR: ADEQUATE
POTASSIUM SERPL-SCNC: 4.5 MMOL/L (ref 3.5–5.2)
PROT SERPL-MCNC: 6.7 G/DL (ref 6–8.5)
PROT UR QL STRIP: NEGATIVE
PROT UR-MCNC: 19 MG/DL
RBC # BLD AUTO: 4.68 X10E6/UL (ref 3.77–5.28)
RBC #/AREA URNS HPF: ABNORMAL /HPF (ref 0–2)
RBC MORPH BLD: ABNORMAL
SODIUM SERPL-SCNC: 142 MMOL/L (ref 134–144)
SP GR UR: 1.02 (ref 1–1.03)
URINALYSIS REFLEX, 377202: ABNORMAL
UROBILINOGEN UR STRIP-MCNC: 0.2 MG/DL (ref 0.2–1)
WBC # BLD AUTO: 6.6 X10E3/UL (ref 3.4–10.8)
WBC #/AREA URNS HPF: ABNORMAL /HPF (ref 0–5)

## 2019-09-12 RX ORDER — PREDNISONE 5 MG/1
TABLET ORAL
Qty: 30 TAB | Refills: 1 | Status: SHIPPED | OUTPATIENT
Start: 2019-09-12 | End: 2020-12-14 | Stop reason: ALTCHOICE

## 2019-09-18 ENCOUNTER — OFFICE VISIT (OUTPATIENT)
Dept: SURGERY | Age: 49
End: 2019-09-18

## 2019-09-18 VITALS
DIASTOLIC BLOOD PRESSURE: 79 MMHG | WEIGHT: 293 LBS | RESPIRATION RATE: 16 BRPM | BODY MASS INDEX: 45.99 KG/M2 | OXYGEN SATURATION: 96 % | SYSTOLIC BLOOD PRESSURE: 128 MMHG | HEIGHT: 67 IN | TEMPERATURE: 98.1 F | HEART RATE: 74 BPM

## 2019-09-18 DIAGNOSIS — K21.00 GASTROESOPHAGEAL REFLUX DISEASE WITH ESOPHAGITIS: ICD-10-CM

## 2019-09-18 DIAGNOSIS — K22.719 BARRETT'S ESOPHAGUS WITH DYSPLASIA: ICD-10-CM

## 2019-09-18 DIAGNOSIS — K44.9 HIATAL HERNIA: ICD-10-CM

## 2019-09-18 DIAGNOSIS — E66.01 MORBID OBESITY WITH BMI OF 50.0-59.9, ADULT (HCC): Primary | ICD-10-CM

## 2019-09-18 NOTE — PROGRESS NOTES
1. Have you been to the ER, urgent care clinic since your last visit? Hospitalized since your last visit? No    2. Have you seen or consulted any other health care providers outside of the [address] [place] [address] since your last visit? Include any pap smears or colon screening. No      Amandeepl Mellow  Body composition    female  44 y.o. Vitals:    09/18/19 1058   BP: 128/79   Pulse: 74   Resp: 16   Temp: 98.1 °F (36.7 °C)   TempSrc: Oral   SpO2: 96%   Weight: 343 lb (155.6 kg)   Height: 5' 7\" (1.702 m)     Body mass index is 53.72 kg/m². [address]

## 2019-09-19 NOTE — PROGRESS NOTES
Bariatric Surgery Consult    Ronaldo Hudson is a 44 y.o. female with a history of morbid obesity. Her Height: 5' 7\" (170.2 cm), Weight: 343 lb (155.6 kg). Body mass index is 53.72 kg/m². She reports that she has been trying to lose weight for 10 years. Her maximum weight was 343 pounds. She has attended our bariatric surgery information seminar. Delano Kehr wants to consider laparoscopic gastric bypass surgery. Pt is referred by:  Mateo Cordero MD.    Dietary History:   The patient says that in the past, physician supervised, behavior modification, unsupervised diets and Weight Watchers have not resulted in real success. When asked why she was not able to achieve or maintain significant weight loss she replied, \"She has lost about 30lbs with other weight loss attempts\". Number of meals per day: 3  Portion size: large  Snacks: 3 times daily, She is eating a lot of snacks daily    Comorbidities:     Bariatric comorbidities present: GERD, chronic back problems, osteoarthritis and obstructive sleep apnea    Ambulatory status: independent    The patient's reported level of exercise: not active.       Patient Active Problem List    Diagnosis Date Noted    Shortness of breath 09/26/2016    Hypothyroidism due to acquired atrophy of thyroid 10/06/2015    Systemic lupus erythematosus ([address] 01/05/2015    Sjogren's syndrome ([address] 01/05/2015    Obesities, morbid ([address] 01/05/2015     Past Medical History:   Diagnosis Date    Anemia     Iron deficiency    Anxiety     Miranda's esophagus     GERD (gastroesophageal reflux disease)     Lupus ([address]     Psychiatric disorder     anxiety     Sjoegren syndrome     Thyroid disease       Past Surgical History:   Procedure Laterality Date    BREAST SURGERY PROCEDURE UNLISTED  2012    breast biopsy     COLONOSCOPY N/A 8/25/2017    COLONOSCOPY performed by Aliyah Little MD at [facility] ENDOSCOPY    [address] HX CHOLECYSTECTOMY  06/29/2018    Lap ilda Dr Annita Hallman HX HYSTERECTOMY  Dec 2013    bleeding      Social History     Tobacco Use    Smoking status: Never Smoker    Smokeless tobacco: Never Used   Substance Use Topics    Alcohol use: Yes     Comment: rarely      Family History   Problem Relation Age of Onset    Diabetes Father     Heart Disease Father     Hypertension Father     Stroke Father     No Known Problems Mother     Hypertension Brother     Cancer Maternal Uncle         esophageal cancer.  Cancer Paternal Grandfather         lung      . Current Outpatient Medications   Medication Sig    predniSONE (DELTASONE) 5 mg tablet 20mg x 3 days, 15mg x 3 days, 10mg x 3 days, 5mg x 3 days    azaTHIOprine (IMURAN) 50 mg tablet TAKE 3 TABLETS DAILY    azaTHIOprine (IMURAN) 50 mg tablet TAKE 3 TABLETS DAILY    omeprazole (PRILOSEC) 40 mg capsule Take 40 mg by mouth two (2) times a day.  citalopram (CELEXA) 40 mg tablet TAKE 1 TABLET BY MOUTH EVERY DAY    buPROPion XL (WELLBUTRIN XL) 150 mg tablet TAKE 1 TABLET BY MOUTH EVERY MORNING    levothyroxine (SYNTHROID) 150 mcg tablet Take 1 Tab by mouth Daily (before breakfast). Needs labs (Patient taking differently: Take 175 mcg by mouth Daily (before breakfast). Needs labs)     No current facility-administered medications for this visit.        Allergies   Allergen Reactions    Aspirin Anaphylaxis    Peanut Anaphylaxis    Other Plant, Animal, Environmental Runny Nose and Sneezing     Hayfever           Review of Systems:    Constitutional: negative  Ears, Nose, Mouth, Throat, and Face: negative  Respiratory: negative  Cardiovascular: negative  Gastrointestinal: positive for dyspepsia and reflux symptoms  Genitourinary:negative  Integument/Breast: negative  Hematologic/Lymphatic: negative  Musculoskeletal:negative  Neurological: negative  Behavioral/Psychiatric: negative  Endocrine: negative  Allergic/Immunologic: negative    Objective:     Visit Vitals  /79 (BP 1 Location: Left arm, BP Patient Position: Sitting)   Pulse 74   Temp 98.1 °F (36.7 °C) (Oral)   Resp 16   Ht 5' 7\" (1.702 m)   Wt 343 lb (155.6 kg)   LMP 12/03/2013   SpO2 96%   BMI 53.72 kg/m²        Physical Exam:    General:  alert, no distress, morbidly obese   Eyes:  conjunctivae and sclerae normal, pupils equal, round, reactive to light, extraocular movements intact without nystagmus   Throat & Neck: no erythema or exudates noted and neck supple and symmetrical; no palpable masses   Lungs:   clear to auscultation bilaterally   Heart:  Regular rate and rhythm   Abdomen:   obese, soft, nontender, nondistended, no masses or organomegaly,    Extremities: no edema,  no gait disturbances   Skin: Normal.     EGD - Findings:      Esophagus: The esophageal mucosa in the proximal esophagus is normal.  Severe ulcerative/grade D esophagitis is noted starting at 27 cm. Long segment Miranda's compatible mucosa is noted. A large >7 cm hiatal hernia is noted. No Gagandeep's lesions are noted        Stomach: The gastric mucosa has erythema in the body with a few polyps-biopsies taken. .   The fundus was found to be normal with no lesions noted on retroflexion. The angularis is normal as well.     Duodenum:   The bulb and post bulbar mucosa is normal in appearance. The duodenal folds are normal.      Therapies:  biopsy of stomach body,polyp     Specimen:  Specimens were collected as described and send to the laboratory. Complications:   None were encountered during the procedure.         Assessment:     1. Morbid obesity (Body mass index is 53.72 kg/m².) with multiple comorbidities. The patient meets criteria established by the NIH for weight loss surgery candidates. Without weight reduction, co-morbidities will escalate as well as increase risk of early mortality. Our recommendation is the patient could be served with laparoscopic gastric bypass surgery.  I explained to the patient differences between laparoscopic gastric bypass, laparoscopic adjustable gastric banding, and laparoscopic vertical sleeve gastrectomy with respect to expected weight loss, resolution of comorbidities and risks. Ms. Anthony Lamb has attended one our informational meetings and has seen our educational materials. She has requested Dr. Sophie Valle to perform her procedure. I reviewed the role for this procedure as a tool to help her achieve her weight loss goals. I reminded her that effective weight loss comes from lifelong adherence to changes in dietary choices, eating habits and exercise. Recommendation: We will request approval for laparoscopic gastric bypass surgery. We recommend that the patient undergo the following evaluations prior to considering surgery:    Cardiology: no  Dietician: yes  Gastroenterology: yes  Psychiatry/Psychology: yes  Pulmonology: no  Sleep Medicine: no    She has a PMH of lupus and Sjogrens and is on prednisone periodically and on Imuran. She also has Barretts and large hiatal hernia. She will need a more recent EGD and will need it done here at [facility] with our typical GI group. She will need to have clearance from rheumatology for surgery. She will need to be off imuran for 1 month prior and after surgery. She does have higher risk with the immunosupression. Signed By: Jeni Scheuermann, MD     September 18, 2019       Greater than half of the time: 30 minutes was used in counciling the patient about bariatric surgery and the steps she needs to take to move forward with her surgery. Ms. Anthony Lamb has a reminder for a \"due or due soon\" health maintenance. I have asked that she contact her primary care provider for follow-up on this health maintenance.

## 2020-03-16 ENCOUNTER — TELEPHONE (OUTPATIENT)
Dept: RHEUMATOLOGY | Age: 50
End: 2020-03-16

## 2020-03-16 NOTE — TELEPHONE ENCOUNTER
Spoke to pt informed pt per Dr Cindy Ferris message below  Here is what we are recommending about coronavirus:     No recommendations to stop any medications-even immunosuppressive medications at this time. If the disease flares and we need to use steroids this is worse for the immune system so keep the medications that are controlling the disease going for now. Travel is a family/personal decision, not a medical one, however if the immune system is suppressed the CDC is recommending you \"avoid crowds\" and \"stay home as much as possible. \"  Social isolation is a way to stop the spread of this disease. This is why schools are closed. Children seem to be less affected by this virus than adults or elderly. Stanislaw Ferris MD   Also informed pt to wash hands with soap and water and to keep hands away from her face, pt verbally acknowledged understanding

## 2020-03-16 NOTE — TELEPHONE ENCOUNTER
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

## 2020-04-21 ENCOUNTER — TELEPHONE (OUTPATIENT)
Dept: RHEUMATOLOGY | Age: 50
End: 2020-04-21

## 2020-04-21 NOTE — TELEPHONE ENCOUNTER
----- Message from Cherelle Desouza MD sent at 4/20/2020  3:56 PM EDT -----  Regarding: RE: Dr. Shelli Daly  sure  ----- Message -----  From: Rosi Villegas LPN  Sent: 3/47/7615   2:52 PM EDT  To: Cherelle Desouza MD  Subject: FW: Dr. Brooke Ramírez want me to schedule her as VV  ----- Message -----  From: Lasha Christie RN  Sent: 4/20/2020  12:12 PM EDT  To: Radha Enamorado LPN  Subject: FW: Dr. Shelli Daly                            ----- Message -----  From: Anirudh Avalos  Sent: 4/20/2020  11:26 AM EDT  To: [facility] Nurse Pool  Subject: Dr. José Manuel Kamara first and last name and relationship to patient (if not the patient): N/A  Best contact number: [phone]  Preferred date and time: Today  Scheduled appointment date and time: N/A  Reason for appointment: Details to clarify the request:  Patient would like to be seen for a low grade fever of 100.5, leg and arm pain.

## 2020-04-21 NOTE — TELEPHONE ENCOUNTER
Spoke pt informed pt per Dr Villar Hidden that pt can be scheduled for a vv, pt stated that she does not have a fever now and she feels much better, pt stated that she did not need a virtual visit scheduled at this time, I verbally acknowledged understanding

## 2020-07-07 ENCOUNTER — TELEPHONE (OUTPATIENT)
Dept: RHEUMATOLOGY | Age: 50
End: 2020-07-07

## 2020-07-07 NOTE — TELEPHONE ENCOUNTER
----- Message from Júnior Michel MD sent at 7/6/2020  5:38 PM EDT -----  Regarding: RE: Apolinar Eaton / Telephone  Set up a telemed or an appointment. Has not been seen in a year.   ----- Message -----  From: Jacqueline Matthews LPN  Sent: 4/0/0655   3:30 PM EDT  To: Júnior Michel MD  Subject: Michael Zuniga / Telephone                            Please advise   ----- Message -----  From: Crystal Goode  Sent: 7/6/2020   3:15 PM EDT  To: Pat Orellana LPN  Subject: Ian Zuniga / Telephone                              ----- Message -----  From: Trent Fulton  Sent: 7/6/2020   3:09 PM EDT  To: [facility] Office Pool  Subject: Apolinar Eaton / Telephone                                Patient reports possible Lupus flare up (joint pain, fatigue, rash on face) and requests return call from Dr. Apolinar Eaton at [phone].

## 2020-07-07 NOTE — TELEPHONE ENCOUNTER
Spoke to pt informed pt per Dr Vahid Cardenas that the pt needs to have a follow up appt or a telemed appt due to the pt has not been seen in a year, pt was informed that she will need to be seen prior to any medications being prescribed pt verbally acknowledged understanding

## 2020-08-19 ENCOUNTER — DOCUMENTATION ONLY (OUTPATIENT)
Dept: RHEUMATOLOGY | Age: 50
End: 2020-08-19

## 2020-08-19 NOTE — PROGRESS NOTES
I left a VM for this patient to call us back to confirm time change for her appointment on 8/21/20  from 9:30am to 12:30pm per Dr. Deepa Archer.

## 2020-08-21 ENCOUNTER — OFFICE VISIT (OUTPATIENT)
Dept: RHEUMATOLOGY | Age: 50
End: 2020-08-21
Payer: COMMERCIAL

## 2020-08-21 VITALS
TEMPERATURE: 98 F | WEIGHT: 293 LBS | DIASTOLIC BLOOD PRESSURE: 84 MMHG | RESPIRATION RATE: 16 BRPM | HEART RATE: 67 BPM | SYSTOLIC BLOOD PRESSURE: 136 MMHG | BODY MASS INDEX: 53.25 KG/M2 | OXYGEN SATURATION: 96 %

## 2020-08-21 DIAGNOSIS — L93.1 SUBACUTE CUTANEOUS LUPUS ERYTHEMATOSUS: Primary | ICD-10-CM

## 2020-08-21 PROCEDURE — 99214 OFFICE O/P EST MOD 30 MIN: CPT | Performed by: PEDIATRICS

## 2020-08-21 RX ORDER — CETIRIZINE HCL 10 MG
TABLET ORAL
COMMUNITY
End: 2020-12-14 | Stop reason: ALTCHOICE

## 2020-08-21 RX ORDER — CRISABOROLE 20 MG/G
OINTMENT TOPICAL
COMMUNITY
End: 2022-05-09 | Stop reason: ALTCHOICE

## 2020-08-21 NOTE — PROGRESS NOTES
RHEUMATOLOGY PROBLEM LIST AND CHIEF COMPLAINT  1. Lupus (1999) -  fatigue, arthralgia, rash (livedo reticularis), pleurisy, interstitial changes however no interstitial lung disease, proteinuria, fevers, dry eyes, dry mouth and Raynaud's phenomenon, SHREYA, SSA, SSB and double-stranded DNA. Therapy History:  Prior DMARDs: Cellcept (approx. 6 months, stopped because of size of pill),  Hydroxychloroquine (stopped 10/2016, 1/[id number])  Current DMARDs: Imuran (0058-6452, restarted 09/2016-current)    2. Sjogrens syndrome - dry eyes, dry mouth and Raynaud's phenomenon    INTERVAL HISTORY  This is a 44 y.o.  female. Today, the patient complains of pain in the joints. Location: knee, ankle  Severity:  3 on a scale of 0-10  Timing:  All day  Duration:  4 months  Modifying Factors: NA  Context/Associated signs and symptoms: The patient has not been seen since 07/29/2019. She continues on Imuran 150 mg daily. She reports minor knee and ankle discomfort. Patient reports developing a burning and stinging rash on her face in May. She states rash was evaluated by dermatology with biopsy showing eczema and treated with [facility]. She reports 75% resolution but continued stinging pain. Notes that she has had a lot of sun exposure.         RHEUMATOLOGY REVIEW OF SYSTEMS   Positives as per history  Negatives as follows:  Ioana Prateek:  Denies unexplained persistent fevers, weight change, chronic fatigue  HEAD/EYES:   Denies eye redness, blurry vision or sudden loss of vision, HA, temporal artery pain  ENT:    Denies oral/nasal ulcers, recurrent sinus infections,   RESPIRATORY:  No pleuritic pain, history of pleural effusions, hemoptysis, exertional dyspnea  CARDIOVASCULAR:  Denies chest pain, history of pericardial effusions  GASTRO:   Denies heartburn, esophageal dysmotility, abdominal pain, nausea, vomiting, diarrhea, blood in the stool  HEMATOLOGIC:  No easy bruising, purpura, swollen lymph nodes  SKIN:    Denies alopecia, ulcers, nodules   VASCULAR:   Denies edema, cyanosis, raynaud phenomenon  NEUROLOGIC:  Denies specific muscle weakness, paresthesias   PSYCHIATRIC:  No sleep disturbance / snoring, depression, anxiety  MSK:    No morning stiffness >1 hour, SI joint pain, persistent joint swelling, persistent joint pain    PAST MEDICAL HISTORY  Reviewed with patient, significant changes in medical history - no    PHYSICAL EXAM  Blood pressure 136/84, pulse 67, temperature 98 °F (36.7 °C), temperature source Oral, resp. rate 16, weight 340 lb (154.2 kg), last menstrual period 12/03/2013, SpO2 96 %. GENERAL APPEARANCE: Well-nourished, no acute distress  NECK: No adenopathy  ENT: No oral ulcers  CARDIOVASCULAR: Heart rhythm is regular. No murmur, rub, gallop  CHEST: Normal vesicular breath sounds. No wheezes, rales, pleural friction rubs  ABDOMINAL: The abdomen is soft and nontender. Bowel sounds are normal  SKIN: No rash, palpable purpura, digital ulcer, abnormal thickening   MUSCULOSKELETAL:   Upper extremities - full range of motion, no tenderness, no swelling, no synovial thickening and no deformity of joints  Lower extremities - bony prominence of bilateral knees with no tenderness on range of motion - unchanged     LABS, RADIOLOGY AND PROCEDURES  Previous labs reviewed -Yes    ASSESSMENT  1. Lupus (Established problem - Stable disease) - The patient continues to do well on her current regimen. She should continue on Imuran 150 mg daily. Patient should obtain and send our office records including biopsy from dermatology to ensure that her rash is not a complication of her lupus. I will order labs today to check her lupus activity. She should return in 4 months for follow up. 2. Sjogren syndrome (Established problem - Stable disease) - The patient's disease is stable and she does not have major concerns today. She will use symptomatic treatments for dry eyes & dry mouth.    3. Drug therapy monitoring for toxicity (Imuran) - CBC, BUN, Cr, AST, ALT and albumin every 4 months  4. Here is what we are recommending about coronavirus (COVID-19):  No recommendations to stop any medications-even immunosuppressive medications at this time. Having an immune-mediated disease does not increase your risk of acquiring COVID-19 or having a worse outcome based on the current data. As far as work we can not provide [facility] papers for time off from work based on diagnosis or use of immunosuppressants. It is a personal decision to stop working; the [facility] is not recommending those on immunosuppressants to stop working. If this changes we will change our recommendations. Attending school/classes is a personal decision and not a medical decision. We have data from the rheum-covid project stating the following: We found that glucocorticoid exposure of ?10?mg/day is associated with a higher odds of hospitalisation and anti-TNF with a decreased odds of hospitalisation in patients with rheumatic disease. Neither exposure to DMARDs nor NSAIDs were associated with increased odds of hospitalisation. The above conclusion is from this study: Characteristics associated with hospitalisation for COVID-19 in people with rheumatic disease: data from the [facility] physician-reported registry     PLAN  1. Imuran 150 mg daily  2. Lupus disease activity labs - CBC, CMP, ESR, CRP, DSDNDA, complements, UA, urine protein/creatinine   3. Obtain dermatology biopsy  4. Return in 4 months    Stanislaw Nicolas MD  Adult and Pediatric Rheumatology     [facility], [address] [facility], Phone [phone], Fax [phone]     Visiting  of Pediatrics    Department of Pediatrics, [address], [address] [place] [address], Phone [phone], Fax [phone]    There are no Patient Instructions on file for this visit.     cc:  Vania Nino, MD    Written by zulema Bran, as dictated by Dr. Promise Cowart M.D.

## 2020-08-21 NOTE — PROGRESS NOTES
Chief Complaint   Patient presents with    Joint Pain     1. Have you been to the ER, urgent care clinic since your last visit? Hospitalized since your last visit? No    2. Have you seen or consulted any other health care providers outside of the [address] [place] [address] since your last visit? Include any pap smears or colon screening.  No

## 2020-09-03 ENCOUNTER — TELEPHONE (OUTPATIENT)
Dept: RHEUMATOLOGY | Age: 50
End: 2020-09-03

## 2020-09-03 NOTE — TELEPHONE ENCOUNTER
Spoke to pt informed pt that the labs were faxed to the [facility] on [address] as requested, pt verbally acknowledged understanding

## 2020-09-10 LAB
ALBUMIN SERPL-MCNC: 4.1 G/DL (ref 3.8–4.9)
ALBUMIN/GLOB SERPL: 1.2 {RATIO} (ref 1.2–2.2)
ALP SERPL-CCNC: 105 IU/L (ref 39–117)
ALT SERPL-CCNC: 36 IU/L (ref 0–32)
APPEARANCE UR: CLEAR
AST SERPL-CCNC: 37 IU/L (ref 0–40)
BACTERIA #/AREA URNS HPF: ABNORMAL /[HPF]
BASOPHILS # BLD MANUAL: 0.1 X10E3/UL (ref 0–0.2)
BASOPHILS NFR BLD MANUAL: 1 %
BILIRUB SERPL-MCNC: 0.3 MG/DL (ref 0–1.2)
BILIRUB UR QL STRIP: NEGATIVE
BUN SERPL-MCNC: 12 MG/DL (ref 6–24)
BUN/CREAT SERPL: 14 (ref 9–23)
C3 SERPL-MCNC: 153 MG/DL (ref 82–167)
C4 SERPL-MCNC: 15 MG/DL (ref 14–44)
CALCIUM SERPL-MCNC: 9.2 MG/DL (ref 8.7–10.2)
CASTS URNS QL MICRO: ABNORMAL /LPF
CHLORIDE SERPL-SCNC: 99 MMOL/L (ref 96–106)
CO2 SERPL-SCNC: 22 MMOL/L (ref 20–29)
COLOR UR: YELLOW
CREAT SERPL-MCNC: 0.88 MG/DL (ref 0.57–1)
CREAT UR-MCNC: 96.2 MG/DL
CRP SERPL-MCNC: 17 MG/L (ref 0–10)
DIFFERENTIAL COMMENT, 115260: ABNORMAL
DSDNA AB SER QL CLIF: NEGATIVE
EOSINOPHIL # BLD MANUAL: 0.1 X10E3/UL (ref 0–0.4)
EOSINOPHIL NFR BLD MANUAL: 1 %
EPI CELLS #/AREA URNS HPF: >10 /HPF (ref 0–10)
ERYTHROCYTE [DISTWIDTH] IN BLOOD BY AUTOMATED COUNT: 17.5 % (ref 11.7–15.4)
ERYTHROCYTE [SEDIMENTATION RATE] IN BLOOD BY WESTERGREN METHOD: 82 MM/HR (ref 0–40)
GLOBULIN SER CALC-MCNC: 3.4 G/DL (ref 1.5–4.5)
GLUCOSE SERPL-MCNC: 114 MG/DL (ref 65–99)
GLUCOSE UR QL: NEGATIVE
HCT VFR BLD AUTO: 37 % (ref 34–46.6)
HGB BLD-MCNC: 11 G/DL (ref 11.1–15.9)
HGB UR QL STRIP: NEGATIVE
KETONES UR QL STRIP: NEGATIVE
LEUKOCYTE ESTERASE UR QL STRIP: ABNORMAL
LYMPHOCYTES # BLD MANUAL: 0.8 X10E3/UL (ref 0.7–3.1)
LYMPHOCYTES NFR BLD MANUAL: 12 %
MCH RBC QN AUTO: 22.5 PG (ref 26.6–33)
MCHC RBC AUTO-ENTMCNC: 29.7 G/DL (ref 31.5–35.7)
MCV RBC AUTO: 76 FL (ref 79–97)
MICRO URNS: ABNORMAL
MONOCYTES # BLD MANUAL: 0.3 X10E3/UL (ref 0.1–0.9)
MONOCYTES NFR BLD MANUAL: 4 %
MUCOUS THREADS URNS QL MICRO: PRESENT
NEUTROPHILS # BLD MANUAL: 5.7 X10E3/UL (ref 1.4–7)
NEUTROPHILS NFR BLD MANUAL: 82 %
NITRITE UR QL STRIP: NEGATIVE
PH UR STRIP: 5.5 [PH] (ref 5–7.5)
PLATELET # BLD AUTO: 427 X10E3/UL (ref 150–450)
PLATELET BLD QL SMEAR: ADEQUATE
POTASSIUM SERPL-SCNC: 4.7 MMOL/L (ref 3.5–5.2)
PROT SERPL-MCNC: 7.5 G/DL (ref 6–8.5)
PROT UR QL STRIP: NEGATIVE
PROT UR-MCNC: 7.3 MG/DL
RBC # BLD AUTO: 4.89 X10E6/UL (ref 3.77–5.28)
RBC #/AREA URNS HPF: ABNORMAL /HPF (ref 0–2)
RBC MORPH BLD: ABNORMAL
SODIUM SERPL-SCNC: 135 MMOL/L (ref 134–144)
SP GR UR: 1.02 (ref 1–1.03)
UROBILINOGEN UR STRIP-MCNC: 0.2 MG/DL (ref 0.2–1)
WBC # BLD AUTO: 7 X10E3/UL (ref 3.4–10.8)
WBC #/AREA URNS HPF: ABNORMAL /HPF (ref 0–5)

## 2020-12-03 NOTE — TELEPHONE ENCOUNTER
----- Message from Mary Kate Felipe MD sent at 9/2/2020  9:29 PM EDT -----  Regarding: RE: DR. Colin/ Telephone  Send lab orders. See below  ----- Message -----  From: Kumar Crystal RN  Sent: 8/31/2020  12:10 PM EDT  To: Mary Kate Felipe MD  Subject: FW: DR. Colin/ Telephone                           ----- Message -----  From: Lashaun Brown  Sent: 8/31/2020  11:46 AM EDT  To: [facility] Nurse Pool  Subject: DR. Colin/ Telephone                             General Message/Vendor Calls    Caller's first and last name: Pateint      Reason for call: Lab Orders      Callback required yes/no and why: Yes. To advise      Best contact number(s) [phone]      Details to clarify the request: Lab orders that were sent over to [employer] on Friday were not in a recognized format.  Please resubmit orders to Lab [facility] Patient is seen in follow up for   Chief Complaint   Patient presents with    Constipation     HPIfollow up on call for constipation taking senikot  Wellington Mares is a 56 y.o. female evaluated via telephone on 12/3/2020. Consent:  She and/or health care decision maker is aware that that she may receive a bill for this telephone service, depending on her insurance coverage, and has provided verbal consent to proceed: Yes      Documentation:  I communicated with the patient and/or health care decision maker about see below. Details of this discussion including any medical advice provided: see below      I affirm this is a Patient Initiated Episode with a Patient who has not had a related appointment within my department in the past 7 days or scheduled within the next 24 hours.     Patient identification was verified at the start of the visit: Yes    Total Time: minutes: 11-20 minutes    Note: not billable if this call serves to triage the patient into an appointment for the relevant concern      Michelle Mota     Past Medical History:   Diagnosis Date    Allergic rhinitis     GERD (gastroesophageal reflux disease)     Hyperlipidemia     Hypertension      Patient Active Problem List    Diagnosis Date Noted    Type 2 diabetes mellitus without complication, without long-term current use of insulin ([facility] [address] 08/15/2018    Vitamin D deficiency 08/12/2015    Patient overweight 02/12/2014    Hypertension     Hyperlipidemia     Allergic rhinitis     GERD (gastroesophageal reflux disease)      Past Surgical History:   Procedure Laterality Date    DILATION AND CURETTAGE OF UTERUS       Family History   Problem Relation Age of Onset    High Blood Pressure Father     Diabetes Father      Social History     Socioeconomic History    Marital status: Single     Spouse name: Not on file    Number of children: Not on file    Years of education: Not on file    Highest education level: Not on file   Occupational History    Not on file   Social Needs    Financial resource strain: Not hard at all   Wilian-Cristo insecurity     Worry: Never true     Inability: Never true   [employer] needs     Medical: No     Non-medical: No   Tobacco Use    Smoking status: Never Smoker    Smokeless tobacco: Never Used   Substance and Sexual Activity    Alcohol use: No    Drug use: No    Sexual activity: Not on file   Lifestyle    Physical activity     Days per week: Not on file     Minutes per session: Not on file    Stress: Not on file   Relationships    Social connections     Talks on phone: Not on file     Gets together: Not on file     Attends [employer] service: Not on file     Active member of club or organization: Not on file     Attends meetings of clubs or organizations: Not on file     Relationship status: Not on file    Intimate partner violence     Fear of current or ex partner: Not on file     Emotionally abused: Not on file     Physically abused: Not on file     Forced sexual activity: Not on file   Other Topics Concern    Not on file   Social History Narrative    Not on file     Current Outpatient Medications   Medication Sig Dispense Refill    psyllium (KONSYL) 28.3 % POWD powder Take 3.4 g by mouth 2 times daily 538 g 2    spironolactone (ALDACTONE) 50 MG tablet Take 1 tablet by mouth daily 90 tablet 3    atorvastatin (LIPITOR) 40 MG tablet Take 1 tablet by mouth daily 90 tablet 3    lisinopril (PRINIVIL;ZESTRIL) 40 MG tablet Take 1 tablet by mouth daily 90 tablet 3    naproxen (NAPROSYN) 375 MG tablet Take 1 tablet by mouth 2 times daily (with meals) 180 tablet 3    omeprazole (PRILOSEC) 20 MG delayed release capsule Take 1 capsule by mouth daily 90 capsule 3     No current facility-administered medications for this visit.       Current Outpatient Medications on File Prior to Visit   Medication Sig Dispense Refill    psyllium (KONSYL) 28.3 % POWD powder Take 3.4 g by mouth 2 times daily 538 g 2    Negative. Physical Exam  There were no vitals filed for this visit. Physical Exam    Assessment   Diagnosis Orders   1. Constipation, unspecified constipation type       Problem List     None          Plan  Fiber and increased fluids  No orders of the defined types were placed in this encounter. No follow-ups on file.   Malia Glass MD

## 2020-12-14 ENCOUNTER — OFFICE VISIT (OUTPATIENT)
Dept: RHEUMATOLOGY | Age: 50
End: 2020-12-14
Payer: COMMERCIAL

## 2020-12-14 VITALS
TEMPERATURE: 96.8 F | RESPIRATION RATE: 16 BRPM | DIASTOLIC BLOOD PRESSURE: 80 MMHG | HEART RATE: 83 BPM | BODY MASS INDEX: 53.22 KG/M2 | WEIGHT: 293 LBS | SYSTOLIC BLOOD PRESSURE: 136 MMHG | OXYGEN SATURATION: 95 %

## 2020-12-14 DIAGNOSIS — E55.9 VITAMIN D DEFICIENCY: ICD-10-CM

## 2020-12-14 DIAGNOSIS — L93.1 SUBACUTE CUTANEOUS LUPUS ERYTHEMATOSUS: Primary | ICD-10-CM

## 2020-12-14 PROCEDURE — 99214 OFFICE O/P EST MOD 30 MIN: CPT | Performed by: PEDIATRICS

## 2020-12-14 NOTE — PROGRESS NOTES
Chief Complaint   Patient presents with    Joint Pain     1. Have you been to the ER, urgent care clinic since your last visit? Hospitalized since your last visit? No    2. Have you seen or consulted any other health care providers outside of the [address] [place] [address] since your last visit? Include any pap smears or colon screening.  No

## 2020-12-14 NOTE — PROGRESS NOTES
RHEUMATOLOGY PROBLEM LIST AND CHIEF COMPLAINT  1. Lupus (1999) -  fatigue, arthralgia, rash (livedo reticularis), pleurisy, interstitial changes however no interstitial lung disease, proteinuria, fevers, dry eyes, dry mouth and Raynaud's phenomenon, SHREYA, SSA, SSB and double-stranded DNA. Therapy History:  Prior DMARDs: Cellcept (approx. 6 months, stopped because of size of pill),  Hydroxychloroquine (stopped 10/2016, 1/[id number]), Imuran (0442-6074, restarted 09/2016-9/2020)  Current DMARDs: none     2. Sjogrens syndrome - dry eyes, dry mouth and Raynaud's phenomenon    INTERVAL HISTORY  This is a 44 y.o.  female. Today, the patient complains of pain in the joints. Location: knee, ankle  Severity:  3 on a scale of 0-10  Timing:  All day  Duration:  4 months  Modifying Factors: NA  Context/Associated signs and symptoms: The patient reports doing well with no pain and resolved rash. She states that she stopped Imuran 150 mg daily in 9/2020 due to personal concerns regarding the Covid-19 pandemic. Labs reviewed included overall normal CMP, CBC, elevated ESR (82) and CRP. Patient mentions undergoing a sleep study with resulting use of CPAP has resolved her fatigue and generalized aches and pains. Denies new medications or medical issues.        RHEUMATOLOGY REVIEW OF SYSTEMS   Positives as per history  Negatives as follows:  Radha Aponte:  Denies unexplained persistent fevers, weight change, chronic fatigue  HEAD/EYES:   Denies eye redness, blurry vision or sudden loss of vision, HA, temporal artery pain  ENT:    Denies oral/nasal ulcers, recurrent sinus infections,   RESPIRATORY:  No pleuritic pain, history of pleural effusions, hemoptysis, exertional dyspnea  CARDIOVASCULAR:  Denies chest pain, history of pericardial effusions  GASTRO:   Denies heartburn, esophageal dysmotility, abdominal pain, nausea, vomiting, diarrhea, blood in the stool  HEMATOLOGIC:  No easy bruising, purpura, swollen lymph nodes  SKIN:    Denies alopecia, ulcers, nodules   VASCULAR:   Denies edema, cyanosis, raynaud phenomenon  NEUROLOGIC:  Denies specific muscle weakness, paresthesias   PSYCHIATRIC:  No sleep disturbance / snoring, depression, anxiety  MSK:    No morning stiffness >1 hour, SI joint pain, persistent joint swelling, persistent joint pain    PAST MEDICAL HISTORY  Reviewed with patient, significant changes in medical history - no    PHYSICAL EXAM  Blood pressure 136/80, pulse 83, temperature 96.8 °F (36 °C), temperature source Temporal, resp. rate 16, weight 339 lb 12.8 oz (154.1 kg), last menstrual period 12/03/2013, SpO2 95 %. GENERAL APPEARANCE: Well-nourished, no acute distress  NECK: No adenopathy  ENT: No oral ulcers  CARDIOVASCULAR: Heart rhythm is regular. No murmur, rub, gallop  CHEST: Normal vesicular breath sounds. No wheezes, rales, pleural friction rubs  ABDOMINAL: The abdomen is soft and nontender. Bowel sounds are normal  SKIN: No rash, palpable purpura, digital ulcer, abnormal thickening   MUSCULOSKELETAL:   Upper extremities - full range of motion, no tenderness, no swelling, no synovial thickening and no deformity of joints  Lower extremities - bony prominence of bilateral knees with no tenderness on range of motion - unchanged     LABS, RADIOLOGY AND PROCEDURES  Previous labs reviewed -Yes    ASSESSMENT  1. Lupus - Stable Disease - The patient continues to do well despite stopping her Imuran 150 mg daily. For now, she can continue off this medication. She should return for follow up in 6 months. 2. Sjogren syndrome - Stable Disease - The patient's disease remains stable with no major concerns. She should continue using symptomatic treatment for her dry eyes and dry mouth. 3. Drug therapy monitoring for toxicity (Imuran) - CBC, BUN, Cr, AST, ALT and albumin every 4 months  4.  Here is what we are recommending about coronavirus (COVID-19):  No recommendations to stop any medications-even immunosuppressive medications at this time. Having an immune-mediated disease does not increase your risk of acquiring COVID-19 or having a worse outcome based on the current data. As far as work we can not provide [place] papers for time off from work based on diagnosis or use of immunosuppressants. It is a personal decision to stop working; the [facility]'S [facility] is not recommending those on immunosuppressants to stop working. If this changes we will change our recommendations. Attending school/classes is a personal decision and not a medical decision. We have data from the rheum-covid project stating the following: We found that glucocorticoid exposure of ?10?mg/day is associated with a higher odds of hospitalisation and anti-TNF with a decreased odds of hospitalisation in patients with rheumatic disease. Neither exposure to DMARDs nor NSAIDs were associated with increased odds of hospitalisation. The above conclusion is from this study: Characteristics associated with hospitalisation for COVID-19 in people with rheumatic disease: data from the [facility] physician-reported registry     PLAN  1. Return in 6 months    Stanislaw Laughlin MD  Adult and Pediatric Rheumatology     [facility], [address] [facility], Phone [phone], Fax [phone]     Visiting  of Pediatrics    Department of Pediatrics, [address], [address] [place] [address], Phone [phone], Fax [phone]    There are no Patient Instructions on file for this visit.     cc:  Burgess Shannen MD    Written by zulema Flanagan Cap, as dictated by Dr. Perla Giang M.D.

## 2021-04-26 NOTE — TELEPHONE ENCOUNTER
----- Message from Eugene Moreno sent at 9/28/2018  8:56 AM EDT -----  Regarding: Colin/Rx  Pts  Lanette Cordova is requesting a Rx for Prednisone. He stated she is having a lupus flair up. [facility] number is [phone] and [facility] number is [phone].
Spoke to pt , informed pt  per Dr Rafa Ferrari that the script was [facility] the [facility] requested, also informed pt that Mrs Noe Prom needs a follow up appt in the next month or so, pt  verbally acknowledged understanding
Awake/Alert/Cooperative

## 2021-10-18 NOTE — LETTER
[address] 
[facility] EMERGENCY DEPT 
[address] [place] [address] [address] [address] [phone] Work/School Note Date: 6/19/2018 To Whom It May concern: 
 
Kendall Mohan was seen and treated today in the emergency room by the following provider(s): 
Attending Provider: Milton Buchanan MD.   
 
Kendall Mohan should be excused from work on 6/19/2018. Sincerely, Milton Buchanan MD 
 
 
 
 Statement Selected

## 2022-02-04 RX ORDER — PREDNISONE 5 MG/1
TABLET ORAL
Qty: 30 TABLET | Refills: 1 | Status: SHIPPED | OUTPATIENT
Start: 2022-02-04 | End: 2022-02-09 | Stop reason: SDUPTHER

## 2022-02-09 RX ORDER — PREDNISONE 5 MG/1
TABLET ORAL
Qty: 30 TABLET | Refills: 1 | Status: SHIPPED | OUTPATIENT
Start: 2022-02-09 | End: 2022-05-09 | Stop reason: ALTCHOICE

## 2022-02-11 ENCOUNTER — HOSPITAL ENCOUNTER (OUTPATIENT)
Dept: GENERAL RADIOLOGY | Age: 52
Discharge: HOME | End: 2022-02-11
Payer: COMMERCIAL

## 2022-02-11 ENCOUNTER — TRANSCRIBE ORDER (OUTPATIENT)
Dept: REGISTRATION | Age: 52
End: 2022-02-11

## 2022-02-11 DIAGNOSIS — R07.9 CHEST PAIN: ICD-10-CM

## 2022-02-11 DIAGNOSIS — R07.9 CHEST PAIN: Primary | ICD-10-CM

## 2022-02-11 PROCEDURE — 71046 X-RAY EXAM CHEST 2 VIEWS: CPT

## 2022-03-01 ENCOUNTER — APPOINTMENT (OUTPATIENT)
Dept: GENERAL RADIOLOGY | Age: 52
End: 2022-03-01
Attending: EMERGENCY MEDICINE
Payer: COMMERCIAL

## 2022-03-01 LAB
ALBUMIN SERPL-MCNC: 3.3 G/DL (ref 3.5–5)
ALBUMIN/GLOB SERPL: 0.7 {RATIO} (ref 1.1–2.2)
ALP SERPL-CCNC: 96 U/L (ref 45–117)
ALT SERPL-CCNC: 55 U/L (ref 12–78)
ANION GAP SERPL CALC-SCNC: 5 MMOL/L (ref 5–15)
AST SERPL-CCNC: 35 U/L (ref 15–37)
BASOPHILS # BLD: 0 K/UL (ref 0–0.1)
BASOPHILS NFR BLD: 0 % (ref 0–1)
BILIRUB SERPL-MCNC: 0.3 MG/DL (ref 0.2–1)
BNP SERPL-MCNC: 65 PG/ML
BUN SERPL-MCNC: 13 MG/DL (ref 6–20)
BUN/CREAT SERPL: 14 (ref 12–20)
CALCIUM SERPL-MCNC: 8.7 MG/DL (ref 8.5–10.1)
CHLORIDE SERPL-SCNC: 105 MMOL/L (ref 97–108)
CO2 SERPL-SCNC: 27 MMOL/L (ref 21–32)
CREAT SERPL-MCNC: 0.94 MG/DL (ref 0.55–1.02)
DIFFERENTIAL METHOD BLD: ABNORMAL
EOSINOPHIL # BLD: 0.1 K/UL (ref 0–0.4)
EOSINOPHIL NFR BLD: 2 % (ref 0–7)
ERYTHROCYTE [DISTWIDTH] IN BLOOD BY AUTOMATED COUNT: 18.7 % (ref 11.5–14.5)
GLOBULIN SER CALC-MCNC: 4.5 G/DL (ref 2–4)
GLUCOSE SERPL-MCNC: 187 MG/DL (ref 65–100)
HCT VFR BLD AUTO: 38.8 % (ref 35–47)
HGB BLD-MCNC: 11.3 G/DL (ref 11.5–16)
IMM GRANULOCYTES # BLD AUTO: 0 K/UL (ref 0–0.04)
IMM GRANULOCYTES NFR BLD AUTO: 0 % (ref 0–0.5)
LYMPHOCYTES # BLD: 2.1 K/UL (ref 0.8–3.5)
LYMPHOCYTES NFR BLD: 23 % (ref 12–49)
MCH RBC QN AUTO: 21.6 PG (ref 26–34)
MCHC RBC AUTO-ENTMCNC: 29.1 G/DL (ref 30–36.5)
MCV RBC AUTO: 74.3 FL (ref 80–99)
MONOCYTES # BLD: 0.3 K/UL (ref 0–1)
MONOCYTES NFR BLD: 3 % (ref 5–13)
NEUTS SEG # BLD: 6.7 K/UL (ref 1.8–8)
NEUTS SEG NFR BLD: 72 % (ref 32–75)
NRBC # BLD: 0 K/UL (ref 0–0.01)
NRBC BLD-RTO: 0 PER 100 WBC
PLATELET # BLD AUTO: 342 K/UL (ref 150–400)
PMV BLD AUTO: 10.4 FL (ref 8.9–12.9)
POTASSIUM SERPL-SCNC: 3.7 MMOL/L (ref 3.5–5.1)
PROT SERPL-MCNC: 7.8 G/DL (ref 6.4–8.2)
RBC # BLD AUTO: 5.22 M/UL (ref 3.8–5.2)
SODIUM SERPL-SCNC: 137 MMOL/L (ref 136–145)
TROPONIN-HIGH SENSITIVITY: 6 NG/L (ref 0–51)
WBC # BLD AUTO: 9.3 K/UL (ref 3.6–11)

## 2022-03-01 PROCEDURE — 36415 COLL VENOUS BLD VENIPUNCTURE: CPT

## 2022-03-01 PROCEDURE — 71045 X-RAY EXAM CHEST 1 VIEW: CPT

## 2022-03-01 PROCEDURE — 80053 COMPREHEN METABOLIC PANEL: CPT

## 2022-03-01 PROCEDURE — 83880 ASSAY OF NATRIURETIC PEPTIDE: CPT

## 2022-03-01 PROCEDURE — 85025 COMPLETE CBC W/AUTO DIFF WBC: CPT

## 2022-03-01 PROCEDURE — 99285 EMERGENCY DEPT VISIT HI MDM: CPT

## 2022-03-01 PROCEDURE — 93005 ELECTROCARDIOGRAM TRACING: CPT

## 2022-03-01 PROCEDURE — 84484 ASSAY OF TROPONIN QUANT: CPT

## 2022-03-02 ENCOUNTER — HOSPITAL ENCOUNTER (EMERGENCY)
Age: 52
Discharge: HOME | End: 2022-03-02
Attending: EMERGENCY MEDICINE
Payer: COMMERCIAL

## 2022-03-02 VITALS
DIASTOLIC BLOOD PRESSURE: 98 MMHG | BODY MASS INDEX: 47.09 KG/M2 | SYSTOLIC BLOOD PRESSURE: 150 MMHG | TEMPERATURE: 98 F | WEIGHT: 293 LBS | HEIGHT: 66 IN | RESPIRATION RATE: 19 BRPM | OXYGEN SATURATION: 93 % | HEART RATE: 79 BPM

## 2022-03-02 DIAGNOSIS — R07.9 ACUTE CHEST PAIN: Primary | ICD-10-CM

## 2022-03-02 LAB
ATRIAL RATE: 73 BPM
CALCULATED P AXIS, ECG09: 34 DEGREES
CALCULATED R AXIS, ECG10: 37 DEGREES
CALCULATED T AXIS, ECG11: 38 DEGREES
D DIMER PPP FEU-MCNC: 0.2 MG/L FEU (ref 0–0.65)
DIAGNOSIS, 93000: NORMAL
P-R INTERVAL, ECG05: 140 MS
Q-T INTERVAL, ECG07: 418 MS
QRS DURATION, ECG06: 74 MS
QTC CALCULATION (BEZET), ECG08: 460 MS
TROPONIN-HIGH SENSITIVITY: 7 NG/L (ref 0–51)
VENTRICULAR RATE, ECG03: 73 BPM

## 2022-03-02 PROCEDURE — 36415 COLL VENOUS BLD VENIPUNCTURE: CPT

## 2022-03-02 PROCEDURE — 84484 ASSAY OF TROPONIN QUANT: CPT

## 2022-03-02 PROCEDURE — 74011250637 HC RX REV CODE- 250/637: Performed by: EMERGENCY MEDICINE

## 2022-03-02 PROCEDURE — 85379 FIBRIN DEGRADATION QUANT: CPT

## 2022-03-02 PROCEDURE — 74011000250 HC RX REV CODE- 250: Performed by: EMERGENCY MEDICINE

## 2022-03-02 RX ADMIN — LIDOCAINE HYDROCHLORIDE 40 ML: 20 SOLUTION ORAL; TOPICAL at 01:20

## 2022-03-02 NOTE — Clinical Note
[address]  [facility] EMERGENCY DEPT  [address] [id number] [phone]    Work/School Note    Date: 3/1/2022    To Whom It May concern:      Mike Cavazos was seen and treated today in the emergency room by the following provider(s):  Attending Provider: Shiva Herrera MD.      Mike Cavazos is excused from work/school on 03/02/22. She is clear to return to work/school on 03/03/22.         Sincerely,          Radha Ortega MD

## 2022-03-02 NOTE — ED NOTES
I have reviewed discharge instructions with the patient. The patient verbalized understanding and all questions were answered at this time. Pt ambulated out.

## 2022-03-02 NOTE — ED PROVIDER NOTES
EMERGENCY DEPARTMENT HISTORY AND PHYSICAL EXAM      Date: 3/2/2022  Patient Name: Estefanía Diallo    History of Presenting Illness     Chief Complaint   Patient presents with    Chest Pain     Patient arrives with complaint of crushing chest pain off and on all day but now constant for the past 40 minutes. History Provided By: Patient    HPI: Estefanía Diallo, 46 y.o. female with PMHx significant for GERD, sleep apnea, lupus, Sjogren's, hypothyroidism, anxiety, anemia presents to the ED with chief complaint of intermittent chest pain for the past 2 to 3 days. Tonight her symptoms started at 7:30 AM unrelenting and severe so she decided to come in for evaluation. Reports her pain was crushing and 8 out of 10 in intensity. It is since lessened and is now 2-3 out of 10. She reports pain is worse when she moves about. Denies any shortness of breath at rest but does report she gets short of breath with walking. Denies any nausea, vomiting, dizziness. She denies any palpitations, leg swelling. Patient reports her symptoms started several weeks ago initially, but she took prednisone for presumed costochondritis and her symptoms improved. Since then her chest pain has come back over the last few days. Review of medical records reveals that she had a equivocal stress test several years ago and had a follow-up CT of coronary arteries that did not show any evidence of significant coronary artery disease. She has an appointment scheduled on March 31 with Dr. Joslyn Mueller. Patient is a non-smoker. Patient reports she recently had pneumonia and tested positive on the antibody test for COVID a few weeks ago, right about the time that her first chest pain symptoms had started. She is status post DasientanPrognomix vaccine in March 2021 and a Pfizer booster in October 2021. PCP: Freddie Breaux MD    No current facility-administered medications on file prior to encounter.      Current Outpatient Medications on File Prior to Encounter   Medication Sig Dispense Refill    predniSONE (DELTASONE) 5 mg tablet 20mg x 3 days, 15mg x 3 days, 10mg x 3 days, 5mg x 3 days 30 Tablet 1    crisaborole (Eucrisa) 2 % oint by Apply Externally route.  omeprazole (PRILOSEC) 40 mg capsule Take 40 mg by mouth two (2) times a day.  citalopram (CELEXA) 40 mg tablet TAKE 1 TABLET BY MOUTH EVERY DAY 30 Tab 6    buPROPion XL (WELLBUTRIN XL) 150 mg tablet TAKE 1 TABLET BY MOUTH EVERY MORNING 30 Tab 2    levothyroxine (SYNTHROID) 150 mcg tablet Take 1 Tab by mouth Daily (before breakfast). Needs labs (Patient taking differently: Take 175 mcg by mouth Daily (before breakfast). Needs labs) 15 Tab 0       Past History     Past Medical History:  Past Medical History:   Diagnosis Date    Anemia     Iron deficiency    Anxiety     Miranda's esophagus     GERD (gastroesophageal reflux disease)     Lupus ([address]     Psychiatric disorder     anxiety     Sjoegren syndrome     Thyroid disease        Past Surgical History:  Past Surgical History:   Procedure Laterality Date    COLONOSCOPY N/A 8/25/2017    COLONOSCOPY performed by Marcelino Durant MD at [facility] ENDOSCOPY    HX [address]    HX CHOLECYSTECTOMY  06/29/2018    Lap ilda [address] Salty Danielson HX HYSTERECTOMY  Dec 2013    bleeding    MS BREAST SURGERY PROCEDURE UNLISTED  2012    breast biopsy        Family History:  Family History   Problem Relation Age of Onset    Diabetes Father     Heart Disease Father     Hypertension Father     Stroke Father     No Known Problems Mother     Hypertension Brother     Cancer Maternal Uncle         esophageal cancer.  Cancer Paternal Grandfather         lung       Social History:  Social History     Tobacco Use    Smoking status: Never Smoker    Smokeless tobacco: Never Used   Substance Use Topics    Alcohol use: Yes     Comment: rarely    Drug use: No       Allergies:   Allergies   Allergen Reactions    Aspirin Anaphylaxis    Peanut Anaphylaxis    Other Plant, Animal, Environmental Runny Nose and Sneezing     Hayfever           Review of Systems   Review of Systems   Constitutional: Negative for chills and fever. HENT: Negative for congestion, rhinorrhea and sore throat. Eyes: Negative. Respiratory: Positive for shortness of breath. Negative for cough. Cardiovascular: Positive for chest pain. Negative for palpitations and leg swelling. Gastrointestinal: Negative for abdominal pain, constipation, diarrhea, nausea and vomiting. Genitourinary: Negative for dysuria, flank pain and hematuria. Musculoskeletal: Negative for back pain, myalgias and neck pain. Skin: Negative. Neurological: Negative for dizziness, syncope, light-headedness and headaches. Psychiatric/Behavioral: Negative for confusion. The patient is nervous/anxious. All other systems reviewed and are negative.         Physical Exam    General appearance -obese, well appearing, and in no distress  Eyes - pupils equal and reactive, extraocular eye movements intact  ENT - mucous membranes moist, pharynx normal without lesions  Neck - supple, no significant adenopathy; non-tender to palpation  Chest - clear to auscultation, no wheezes, rales or rhonchi; tender to palpation anterior chest wall  Heart - normal rate and regular rhythm, S1 and S2 normal, no murmurs noted  Abdomen - soft, nontender, nondistended, no masses or organomegaly  Musculoskeletal - no joint tenderness, deformity or swelling; normal ROM  Extremities - peripheral pulses normal, trace bilateral pedal edema  Skin - normal coloration and turgor, no rashes  Neurological - alert, oriented x3, normal speech, no focal findings or movement disorder noted    Diagnostic Study Results     Labs -     Recent Results (from the past 12 hour(s))   EKG, 12 LEAD, INITIAL    Collection Time: 03/01/22  8:49 PM   Result Value Ref Range    Ventricular Rate 73 BPM    Atrial Rate 73 BPM    P-R Interval 140 ms    QRS Duration 74 ms    Q-T Interval 418 ms    QTC Calculation (Bezet) 460 ms    Calculated P Axis 34 degrees    Calculated R Axis 37 degrees    Calculated T Axis 38 degrees    Diagnosis       Normal sinus rhythm  Cannot rule out Anterior infarct , age undetermined  When compared with ECG of 25-JUN-2018 11:53,  No significant change was found     CBC WITH AUTOMATED DIFF    Collection Time: 03/01/22  8:59 PM   Result Value Ref Range    WBC 9.3 3.6 - 11.0 K/uL    RBC 5.22 (H) 3.80 - 5.20 M/uL    HGB 11.3 (L) 11.5 - 16.0 g/dL    HCT 38.8 35.0 - 47.0 %    MCV 74.3 (L) 80.0 - 99.0 FL    MCH 21.6 (L) 26.0 - 34.0 PG    MCHC 29.1 (L) 30.0 - 36.5 g/dL    RDW 18.7 (H) 11.5 - 14.5 %    PLATELET 189 399 - 107 K/uL    MPV 10.4 8.9 - 12.9 FL    NRBC 0.0 0  WBC    ABSOLUTE NRBC 0.00 0.00 - 0.01 K/uL    NEUTROPHILS 72 32 - 75 %    LYMPHOCYTES 23 12 - 49 %    MONOCYTES 3 (L) 5 - 13 %    EOSINOPHILS 2 0 - 7 %    BASOPHILS 0 0 - 1 %    IMMATURE GRANULOCYTES 0 0.0 - 0.5 %    ABS. NEUTROPHILS 6.7 1.8 - 8.0 K/UL    ABS. LYMPHOCYTES 2.1 0.8 - 3.5 K/UL    ABS. MONOCYTES 0.3 0.0 - 1.0 K/UL    ABS. EOSINOPHILS 0.1 0.0 - 0.4 K/UL    ABS. BASOPHILS 0.0 0.0 - 0.1 K/UL    ABS. IMM. GRANS. 0.0 0.00 - 0.04 K/UL    DF AUTOMATED     METABOLIC PANEL, COMPREHENSIVE    Collection Time: 03/01/22  8:59 PM   Result Value Ref Range    Sodium 137 136 - 145 mmol/L    Potassium 3.7 3.5 - 5.1 mmol/L    Chloride 105 97 - 108 mmol/L    CO2 27 21 - 32 mmol/L    Anion gap 5 5 - 15 mmol/L    Glucose 187 (H) 65 - 100 mg/dL    BUN 13 6 - 20 MG/DL    Creatinine 0.94 0.55 - 1.02 MG/DL    BUN/Creatinine ratio 14 12 - 20      GFR est AA >60 >60 ml/min/1.73m2    GFR est non-AA >60 >60 ml/min/1.73m2    Calcium 8.7 8.5 - 10.1 MG/DL    Bilirubin, total 0.3 0.2 - 1.0 MG/DL    ALT (SGPT) 55 12 - 78 U/L    AST (SGOT) 35 15 - 37 U/L    Alk.  phosphatase 96 45 - 117 U/L    Protein, total 7.8 6.4 - 8.2 g/dL    Albumin 3.3 (L) 3.5 - 5.0 g/dL    Globulin 4.5 (H) 2.0 - 4.0 g/dL    A-G Ratio 0.7 (L) 1.1 - 2.2     NT-PRO BNP    Collection Time: 03/01/22  8:59 PM   Result Value Ref Range    NT pro-BNP 65 <125 PG/ML   TROPONIN-HIGH SENSITIVITY    Collection Time: 03/01/22  8:59 PM   Result Value Ref Range    Troponin-High Sensitivity 6 0 - 51 ng/L   TROPONIN-HIGH SENSITIVITY    Collection Time: 03/02/22 12:56 AM   Result Value Ref Range    Troponin-High Sensitivity 7 0 - 51 ng/L   D DIMER    Collection Time: 03/02/22  1:14 AM   Result Value Ref Range    D-dimer 0.20 0.00 - 0.65 mg/L FEU       Radiologic Studies -   XR CHEST PORT   Final Result   Improved aeration in the right lung base with minimal residual   bibasilar airspace opacities. Cardiomegaly with hiatal hernia        CT Results  (Last 48 hours)    None        CXR Results  (Last 48 hours)               03/01/22 2133  XR CHEST PORT Final result    Impression:  Improved aeration in the right lung base with minimal residual   bibasilar airspace opacities. Cardiomegaly with hiatal hernia       Narrative:  EXAM: XR CHEST PORT       INDICATION: chest pain       COMPARISON: 2/11/2022       FINDINGS: A portable AP radiograph of the chest was obtained at 2128 hours. The   patient is on a cardiac monitor. Improved aeration right lung base with minimal   residual patchy right basilar airspace opacity. Mild patchy left basilar   airspace opacity is unchanged. . Probable hiatal hernia. Mild cardiomegaly. .  The   bones and soft tissues are grossly within normal limits. Medical Decision Making   I am the first provider for this patient. I reviewed the vital signs, available nursing notes, past medical history, past surgical history, family history and social history. Vital Signs-Reviewed the patient's vital signs.   Patient Vitals for the past 12 hrs:   Pulse Resp BP SpO2   03/01/22 2045 70 18 (!) 151/85 100 %       EKG: Normal sinus rhythm, 73 bpm, normal axis, normal [place], QRS, QTc intervals, no ischemic changes  Records Reviewed: Nursing Notes and Old Medical Records    Provider Notes (Medical Decision Making):   Differential diagnosis: Chest wall strain, pericarditis, costochondritis, PE, pneumonia, acute coronary syndrome, GERD  we will check CBC, CMP, serial troponins, chest x-ray, D-dimer, EKG. ED Course:   Initial assessment performed. The patients presenting problems have been discussed, and they are in agreement with the care plan formulated and outlined with them. I have encouraged them to ask questions as they arise throughout their visit. Progress Notes:  ED Course as of 03/03/22 2339   Wed Mar 02, 2022   6933 Patient is feeling better. EKG is reassuring. Serial high-sensitivity troponins are unremarkable. D-dimer is normal.  Chest x-ray shows improvement in airspace disease compared to previous. Will discharge with instructions to follow-up with Dr. Jayden Mcnulty and return to ED for worsening symptoms. [AO]      ED Course User Index  [AO] Charlotte Mancilla MD       Disposition:  Discharge home    PLAN:  1. Current Discharge Medication List        2. Follow-up Information     Follow up With Specialties Details Why Contact Info    [facility] EMERGENCY DEPT Emergency Medicine  If symptoms worsen [address] [place] [address]  [phone]    Ocie Severe, MD Cardio Vascular Surgery, Cardiology, Interventional Cardiology Call today  [address]  [address] [address] [phone]          Return to ED if worse     Diagnosis     Clinical Impression:   1.  Acute chest pain

## 2022-03-31 ENCOUNTER — OFFICE VISIT (OUTPATIENT)
Dept: CARDIOLOGY CLINIC | Age: 52
End: 2022-03-31
Payer: COMMERCIAL

## 2022-03-31 VITALS
WEIGHT: 293 LBS | DIASTOLIC BLOOD PRESSURE: 88 MMHG | SYSTOLIC BLOOD PRESSURE: 120 MMHG | OXYGEN SATURATION: 97 % | BODY MASS INDEX: 47.09 KG/M2 | HEART RATE: 82 BPM | RESPIRATION RATE: 18 BRPM | HEIGHT: 66 IN

## 2022-03-31 DIAGNOSIS — R07.89 ATYPICAL CHEST PAIN: ICD-10-CM

## 2022-03-31 DIAGNOSIS — E66.01 OBESITIES, MORBID (HCC): ICD-10-CM

## 2022-03-31 DIAGNOSIS — R94.31 ABNORMAL EKG: ICD-10-CM

## 2022-03-31 DIAGNOSIS — R93.1 AGATSTON CAC SCORE, <100: ICD-10-CM

## 2022-03-31 DIAGNOSIS — R06.02 SHORTNESS OF BREATH: Primary | ICD-10-CM

## 2022-03-31 PROCEDURE — 99204 OFFICE O/P NEW MOD 45 MIN: CPT | Performed by: INTERNAL MEDICINE

## 2022-03-31 PROCEDURE — 93000 ELECTROCARDIOGRAM COMPLETE: CPT | Performed by: INTERNAL MEDICINE

## 2022-03-31 NOTE — LETTER
3/31/2022    Patient: Analilia Mukherjee   YOB: 1970   Date of Visit: 3/31/2022     Milind Adkins MD  [address]  [address] [address]  Via Fax: [phone]    Dear Milind Adkins MD,      Thank you for referring Ms. Cristy Treviño to [facility] for evaluation. My notes for this consultation are attached. If you have questions, please do not hesitate to call me. I look forward to following your patient along with you.       Sincerely,    Khushbu Lawler MD

## 2022-03-31 NOTE — PROGRESS NOTES
[address] [facility] [place], [address]  [phone]     Subjective:      Alisa Melo is a 46 y.o. female is here for to re-establish care. PMHx significant for GERD, sleep apnea, lupus, Sjogren's, hypothyroidism, anxiety, anemia . Last seen by us in 2016. Was doing fine then. She had PNA in February, treated OP by PCP. Went to ED 3/2/2022 per PCP's advice d/t cp and sob. In ED, No ischemia per EKG. Serial high-sensitivity troponins are unremarkable. D-dimer is normal.  Chest x-ray shows improvement in airspace disease. She was recommended to follow up with us. Addendum by Dr. Leach Arrow: Patient tells me that her chest discomfort was pleuritic in nature and resolved after treatment of pneumonia. The patient denies  orthopnea, PND, LE edema, palpitations, syncope, or presyncope.        Patient Active Problem List    Diagnosis Date Noted    Shortness of breath 09/26/2016    Hypothyroidism due to acquired atrophy of thyroid 10/06/2015    Systemic lupus erythematosus ([address] 01/05/2015    Sjogren's syndrome ([address] 01/05/2015    Obesities, morbid ([address] 01/05/2015      Gurmeet Asher MD  Past Medical History:   Diagnosis Date    Anemia     Iron deficiency    Anxiety     Miranda's esophagus     GERD (gastroesophageal reflux disease)     Lupus ([address]     Psychiatric disorder     anxiety     Sjoegren syndrome     Thyroid disease       Past Surgical History:   Procedure Laterality Date    COLONOSCOPY N/A 8/25/2017    COLONOSCOPY performed by Jimbo Salazar MD at [facility] ENDOSCOPY    HX [address]    HX CHOLECYSTECTOMY  06/29/2018    Lap ilda Dr Emelina Whitlock HX HYSTERECTOMY  Dec 2013    bleeding    AR BREAST SURGERY PROCEDURE UNLISTED  2012    breast biopsy      Allergies   Allergen Reactions    Aspirin Anaphylaxis    Peanut Anaphylaxis    Other Plant, Animal, Environmental Runny Nose and Sneezing     Hayfever        Family History Problem Relation Age of Onset    Diabetes Father     Heart Disease Father     Hypertension Father     Stroke Father     No Known Problems Mother     Hypertension Brother     Cancer Maternal Uncle         esophageal cancer.  Cancer Paternal Grandfather         lung      Social History     Socioeconomic History    Marital status:      Spouse name: Not on file    Number of children: Not on file    Years of education: Not on file    Highest education level: Not on file   Occupational History    Not on file   Tobacco Use    Smoking status: Never Smoker    Smokeless tobacco: Never Used   Vaping Use    Vaping Use: Never used   Substance and Sexual Activity    Alcohol use: Yes     Comment: rarely    Drug use: No    Sexual activity: Yes     Partners: Male   Other Topics Concern    Not on file   Social History Narrative    Not on file     Social Determinants of Health     Financial Resource Strain:     Difficulty of Paying Living Expenses: Not on file   Food Insecurity:     Worried About Running Out of Food in the Last Year: Not on file    Felicia of Food in the Last Year: Not on file   Transportation Needs:     Lack of Transportation (Medical): Not on file    Lack of Transportation (Non-Medical):  Not on file   Physical Activity:     Days of Exercise per Week: Not on file    Minutes of Exercise per Session: Not on file   Stress:     Feeling of Stress : Not on file   Social Connections:     Frequency of Communication with Friends and Family: Not on file    Frequency of Social Gatherings with Friends and Family: Not on file    Attends Hinduism Services: Not on file    Active Member of Clubs or Organizations: Not on file    Attends Club or Organization Meetings: Not on file    Marital Status: Not on file   Intimate Partner Violence:     Fear of Current or Ex-Partner: Not on file    Emotionally Abused: Not on file    Physically Abused: Not on file    Sexually Abused: Not on file Housing Stability:     Unable to Pay for Housing in the Last Year: Not on file    Number of Places Lived in the Last Year: Not on file    Unstable Housing in the Last Year: Not on file      Current Outpatient Medications   Medication Sig    omeprazole (PRILOSEC) 40 mg capsule Take 40 mg by mouth two (2) times a day.  citalopram (CELEXA) 40 mg tablet TAKE 1 TABLET BY MOUTH EVERY DAY    buPROPion XL (WELLBUTRIN XL) 150 mg tablet TAKE 1 TABLET BY MOUTH EVERY MORNING    levothyroxine (SYNTHROID) 150 mcg tablet Take 1 Tab by mouth Daily (before breakfast). Needs labs (Patient taking differently: Take 175 mcg by mouth Daily (before breakfast). Needs labs)    predniSONE (DELTASONE) 5 mg tablet 20mg x 3 days, 15mg x 3 days, 10mg x 3 days, 5mg x 3 days    crisaborole (Eucrisa) 2 % oint by Apply Externally route. No current facility-administered medications for this visit. Review of Symptoms:  11 systems reviewed, negative other than as stated in the HPI    Physical ExamPhysical Exam:    Vitals:    03/31/22 1015   BP: 120/88   Pulse: 82   Resp: 18   SpO2: 97%   Weight: 326 lb 4.8 oz (148 kg)   Height: 5' 6\" (1.676 m)     Body mass index is 52.67 kg/m². General PE  Gen:  NAD  Mental Status - Alert. General Appearance - Not in acute distress. HEENT:  PERRL, no carotid bruits or JVD  Chest and Lung Exam   Inspection: Accessory muscles - No use of accessory muscles in breathing. Auscultation:   Breath sounds: - Normal.   Cardiovascular   Inspection: Jugular vein - Bilateral - Inspection Normal.   Palpation/Percussion:   Apical Impulse: - Normal.   Auscultation: Rhythm - Regular. Heart Sounds - S1 WNL and S2 WNL. No S3 or S4. Murmurs & Other Heart Sounds: Auscultation of the heart reveals - No Murmurs. Peripheral Vascular   Upper Extremity: Inspection - Bilateral - No Cyanotic nailbeds or Digital clubbing. Lower Extremity:   Palpation: Edema - Bilateral - No edema.   Abdomen:   Soft, non-tender, bowel sounds are active. Neuro: A&O times 3, CN and motor grossly WNL    Labs:   No results found for: CHOL, CHOLX, CHLST, CHOLV, 971408, HDL, HDLP, LDL, LDLC, DLDLP, TGLX, TRIGL, TRIGP, CHHD, CHHDX  No results found for: CPK, CPKX, CPX  Lab Results   Component Value Date/Time    Sodium 137 03/01/2022 08:59 PM    Potassium 3.7 03/01/2022 08:59 PM    Chloride 105 03/01/2022 08:59 PM    CO2 27 03/01/2022 08:59 PM    Anion gap 5 03/01/2022 08:59 PM    Glucose 187 (H) 03/01/2022 08:59 PM    BUN 13 03/01/2022 08:59 PM    Creatinine 0.94 03/01/2022 08:59 PM    BUN/Creatinine ratio 14 03/01/2022 08:59 PM    GFR est AA >60 03/01/2022 08:59 PM    GFR est non-AA >60 03/01/2022 08:59 PM    Calcium 8.7 03/01/2022 08:59 PM    Bilirubin, total 0.3 03/01/2022 08:59 PM    Alk. phosphatase 96 03/01/2022 08:59 PM    Protein, total 7.8 03/01/2022 08:59 PM    Albumin 3.3 (L) 03/01/2022 08:59 PM    Globulin 4.5 (H) 03/01/2022 08:59 PM    A-G Ratio 0.7 (L) 03/01/2022 08:59 PM    ALT (SGPT) 55 03/01/2022 08:59 PM       EKG:  NSR      Assessment:     Assessment:        ICD-10-CM ICD-9-CM    1. Shortness of breath  R06.02 786.05 AMB POC EKG ROUTINE W/ 12 LEADS, INTER & REP   2. Abnormal EKG  R94.31 794.31 AMB POC EKG ROUTINE W/ 12 LEADS, INTER & REP   3. Obesities, morbid (HCC)  E66.01 278.01    4. Agatston CAC score, <100  R93.1 793.2    5. Atypical chest pain  R07.89 786.59        Orders Placed This Encounter    AMB POC EKG ROUTINE W/ 12 LEADS, INTER & REP     Order Specific Question:   Reason for Exam:     Answer:   routine        Plan:       Shortness of breath/Atypical chest pain  Agatston CAC score, <100 in 2016  Prev cardiac work up in 2016  Echo normal.  Stress test for chronic NICOLE read as possible inferior ischemia. Images personally reviewed. Equivocal in my opinion with her BMI of >55 (<1% of LV myocardium affected on polar mapping)- could be due to shifting soft tissue attenuation.     Will repeat echo in light of recurrent symptoms and history of Covid positivity. Will not do nuclear stress test at this time as symptoms were atypical, resolved with treatment of pneumonia  Coronary calcium scoring would be reassuring if totally normal and threshold for further testing/treatment would be decreased if high- the patient wishes to proceed. Ordered to be scheduled by radiology. Bp controlled    GERD  On PPI    SLE, Sjogren  Followed by Dr Kizzy Colmneares    Discussed importance of diet and exercise - eventual goal of 30 - 60 minutes, 5-7 times a week as per AHA guideline. She has lost 30 lbs since November 2021. She has been doing mindful eating. Continue current care and f/u in     [employer], NP    Patient seen and examined by me with the above nurse practitioner. I personally performed all components of the history, physical, and medical decision making and agree with the assessment and plan with minor modifications as noted. Today the patient presents with resolved atypical chest discomfort in the setting of pneumonia, but with risk factors. General PE  Gen:  NAD  Mental Status - Alert. General Appearance - Not in acute distress. HEENT:  PERRL, no carotid bruits or JVD  Chest and Lung Exam   Inspection: Accessory muscles - No use of accessory muscles in breathing. Auscultation:   Breath sounds: - Normal.   Cardiovascular   Inspection: Jugular vein - Bilateral - Inspection Normal.   Palpation/Percussion:   Apical Impulse: - Normal.   Auscultation: Rhythm - Regular. Heart Sounds - S1 WNL and S2 WNL. No S3 or S4. Murmurs & Other Heart Sounds: Auscultation of the heart reveals - No Murmurs. Peripheral Vascular   Upper Extremity: Inspection - Bilateral - No Cyanotic nailbeds or Digital clubbing. Lower Extremity:   Palpation: Edema - Bilateral - No edema. Abdomen:   Soft, non-tender, bowel sounds are active. Neuro: A&O times 3, CN and motor grossly WNL    Check echo and repeat coronary calcium score as noted.   Follow up as needed if testing is normal.  Encouraged continued improvements in diet and exercise.

## 2022-03-31 NOTE — PROGRESS NOTES
1. Have you been to the ER, urgent care clinic since your last visit? Hospitalized since your last visit? Seen on 3/2/22 for chest pain. 2. Have you seen or consulted any other health care providers outside of the [address] [place] [address] since your last visit? Include any pap smears or colon screening. Seen by PCP and Dx with pnuemonia, chest x-ray.         Chief Complaint   Patient presents with    New Patient     Abn EKG    Chest Pain (Angina)     soboe

## 2022-05-09 ENCOUNTER — OFFICE VISIT (OUTPATIENT)
Dept: RHEUMATOLOGY | Age: 52
End: 2022-05-09
Payer: COMMERCIAL

## 2022-05-09 VITALS
HEART RATE: 69 BPM | RESPIRATION RATE: 16 BRPM | DIASTOLIC BLOOD PRESSURE: 85 MMHG | OXYGEN SATURATION: 98 % | SYSTOLIC BLOOD PRESSURE: 145 MMHG | WEIGHT: 293 LBS | BODY MASS INDEX: 51.97 KG/M2 | TEMPERATURE: 98.2 F

## 2022-05-09 DIAGNOSIS — L93.1 SUBACUTE CUTANEOUS LUPUS ERYTHEMATOSUS: Primary | ICD-10-CM

## 2022-05-09 PROCEDURE — 99214 OFFICE O/P EST MOD 30 MIN: CPT | Performed by: PEDIATRICS

## 2022-05-09 NOTE — PROGRESS NOTES
Chief Complaint   Patient presents with    Joint Pain     1. Have you been to the ER, urgent care clinic since your last visit? Hospitalized since your last visit? No    2. Have you seen or consulted any other health care providers outside of the [address] [place] [address] since your last visit? Include any pap smears or colon screening.  No

## 2022-05-09 NOTE — PROGRESS NOTES
RHEUMATOLOGY PROBLEM LIST AND CHIEF COMPLAINT  1. Lupus (1999) -  fatigue, arthralgia, rash (livedo reticularis), pleurisy, interstitial changes however no interstitial lung disease, proteinuria, fevers, dry eyes, dry mouth and Raynaud's phenomenon, SHREYA, SSA, SSB and double-stranded DNA. Therapy History:  Prior DMARDs: Cellcept (approx. 6 months, stopped because of size of pill),  Hydroxychloroquine (stopped 10/2016, [id number]), Imuran ([id number], restarted 09/2016-9/2020)  Current DMARDs: none   Pt reported COVID-19 vaccination: Vaccinated with 2 dose series and booster    2. Sjogrens syndrome - dry eyes, dry mouth and Raynaud's phenomenon    INTERVAL HISTORY  This is a 46 y.o.  female. Today, the patient complains of pain in the joints. Location: generalized   Severity: 2 on a scale of 0-10  Timing: All day  Duration: few months   Context/Associated signs and symptoms: Last seen 12/2020. She is not currently on treatment as this was stopped prior to last visit. Her chief complaint is an episode of fatigue, pain in hands, wrists, elbows, and chest pain found to be related to pneumonia and COVID-19 diagnosis. Denies pleurisy, oral sores, alopecia. She states use of Prednisone provided significant benefit. Labs reviewed were unremarkable. Of note, she mentions difficulty with recall memory over the past few months.         RHEUMATOLOGY REVIEW OF SYSTEMS   Positives as per history  Negatives as follows:  Selma Cisneros:  Denies unexplained persistent fevers, weight change, chronic fatigue  HEAD/EYES:   Denies eye redness, blurry vision or sudden loss of vision, HA, temporal artery pain  ENT:    Denies oral/nasal ulcers, recurrent sinus infections,   RESPIRATORY:  No pleuritic pain, history of pleural effusions, hemoptysis, exertional dyspnea  CARDIOVASCULAR:  Denies chest pain, history of pericardial effusions  GASTRO:   Denies heartburn, esophageal dysmotility, abdominal pain, nausea, vomiting, diarrhea, blood in the stool  HEMATOLOGIC:  No easy bruising, purpura, swollen lymph nodes  SKIN:    Denies alopecia, ulcers, nodules   VASCULAR:   Denies edema, cyanosis, raynaud phenomenon  NEUROLOGIC:  Denies specific muscle weakness, paresthesias   PSYCHIATRIC:  No sleep disturbance / snoring, depression, anxiety  MSK:    No morning stiffness >1 hour, SI joint pain, persistent joint swelling, persistent joint pain    PAST MEDICAL HISTORY  Reviewed with patient, significant changes in medical history - no    PHYSICAL EXAM  Blood pressure (!) 145/85, pulse 69, temperature 98.2 °F (36.8 °C), temperature source Oral, resp. rate 16, weight 322 lb (146.1 kg), last menstrual period 12/03/2013, SpO2 98 %. GENERAL APPEARANCE: Well-nourished, no acute distress  NECK: No adenopathy  ENT: No oral ulcers  CARDIOVASCULAR: Heart rhythm is regular. No murmur, rub, gallop  CHEST: Normal vesicular breath sounds. No wheezes, rales, pleural friction rubs  ABDOMINAL: The abdomen is soft and nontender. Bowel sounds are normal  SKIN: No rash, palpable purpura, digital ulcer, abnormal thickening   MUSCULOSKELETAL:   Upper extremities - full range of motion, no tenderness, no swelling, no synovial thickening and no deformity of joints  Lower extremities - bony prominence of bilateral knees with no tenderness on range of motion - unchanged     LABS, RADIOLOGY AND PROCEDURES  Previous labs reviewed -Yes    ASSESSMENT  1. Lupus -(is unchanged)- The patient continues to do well off treatment. I would not recommend escalation at this time. She can use a 12 day course of steroids if needed. I will order labs today. Follow up as needed. 2. Sjogren syndrome -(is unchanged)- The patient's disease remains stable with no major concerns. She should continue using symptomatic treatment for her dry eyes and dry mouth. Not a concern today.     3. Drug therapy monitoring for toxicity (Imuran) - CBC, BUN, Cr, AST, ALT and albumin every 4 months    PLAN  1. Symptomatic treatment for dry eyes and dry mouth   2. Lupus disease activity labs - CBC, CMP, ESR, CRP, DSDNDA, complements, UA, urine protein/creatinine   3. Check TSH, vitamin D   4. Follow up as needed     Stanislaw Garcia MD  Adult and Pediatric Rheumatology     [facility], [address] [facility], Phone [phone], Fax [phone]     Visiting  of Pediatrics    Department of Pediatrics, [facility] of [address] [place] [address], [address] [place] [address], Phone [phone], Fax [phone]    There are no Patient Instructions on file for this visit.     cc:  Christelle Henry MD    Written by zulema Shanks, as dictated by Dr. Adama Palumbo M.D.

## 2022-05-11 LAB
25(OH)D3+25(OH)D2 SERPL-MCNC: 27.1 NG/ML (ref 30–100)
ALBUMIN SERPL-MCNC: 4.2 G/DL (ref 3.8–4.9)
ALBUMIN/GLOB SERPL: 1.1 {RATIO} (ref 1.2–2.2)
ALP SERPL-CCNC: 111 IU/L (ref 44–121)
ALT SERPL-CCNC: 49 IU/L (ref 0–32)
APPEARANCE UR: ABNORMAL
AST SERPL-CCNC: 42 IU/L (ref 0–40)
BACTERIA #/AREA URNS HPF: ABNORMAL /[HPF]
BASOPHILS # BLD AUTO: 0 X10E3/UL (ref 0–0.2)
BASOPHILS NFR BLD AUTO: 0 %
BILIRUB SERPL-MCNC: 0.3 MG/DL (ref 0–1.2)
BILIRUB UR QL STRIP: NEGATIVE
BUN SERPL-MCNC: 10 MG/DL (ref 6–24)
BUN/CREAT SERPL: 12 (ref 9–23)
C3 SERPL-MCNC: 171 MG/DL (ref 82–167)
C4 SERPL-MCNC: 18 MG/DL (ref 12–38)
CALCIUM SERPL-MCNC: 9.3 MG/DL (ref 8.7–10.2)
CASTS URNS QL MICRO: ABNORMAL /LPF
CHLORIDE SERPL-SCNC: 102 MMOL/L (ref 96–106)
CO2 SERPL-SCNC: 20 MMOL/L (ref 20–29)
COLOR UR: YELLOW
CREAT SERPL-MCNC: 0.81 MG/DL (ref 0.57–1)
CREAT UR-MCNC: 84.4 MG/DL
CRP SERPL-MCNC: 20 MG/L (ref 0–10)
DSDNA AB SER QL CLIF: POSITIVE
DSDNA AB TITR SER CLIF: ABNORMAL {TITER}
EGFR: 87 ML/MIN/1.73
EOSINOPHIL # BLD AUTO: 0.1 X10E3/UL (ref 0–0.4)
EOSINOPHIL NFR BLD AUTO: 1 %
EPI CELLS #/AREA URNS HPF: ABNORMAL /HPF (ref 0–10)
ERYTHROCYTE [DISTWIDTH] IN BLOOD BY AUTOMATED COUNT: 17.1 % (ref 11.7–15.4)
ERYTHROCYTE [SEDIMENTATION RATE] IN BLOOD BY WESTERGREN METHOD: 100 MM/HR (ref 0–40)
GLOBULIN SER CALC-MCNC: 3.7 G/DL (ref 1.5–4.5)
GLUCOSE SERPL-MCNC: 88 MG/DL (ref 65–99)
GLUCOSE UR QL STRIP: NEGATIVE
HCT VFR BLD AUTO: 40.6 % (ref 34–46.6)
HGB BLD-MCNC: 11.7 G/DL (ref 11.1–15.9)
HGB UR QL STRIP: NEGATIVE
IMM GRANULOCYTES # BLD AUTO: 0 X10E3/UL (ref 0–0.1)
IMM GRANULOCYTES NFR BLD AUTO: 0 %
KETONES UR QL STRIP: NEGATIVE
LEUKOCYTE ESTERASE UR QL STRIP: ABNORMAL
LYMPHOCYTES # BLD AUTO: 2.1 X10E3/UL (ref 0.7–3.1)
LYMPHOCYTES NFR BLD AUTO: 25 %
MCH RBC QN AUTO: 21.5 PG (ref 26.6–33)
MCHC RBC AUTO-ENTMCNC: 28.8 G/DL (ref 31.5–35.7)
MCV RBC AUTO: 75 FL (ref 79–97)
MICRO URNS: ABNORMAL
MONOCYTES # BLD AUTO: 0.4 X10E3/UL (ref 0.1–0.9)
MONOCYTES NFR BLD AUTO: 5 %
NEUTROPHILS # BLD AUTO: 5.7 X10E3/UL (ref 1.4–7)
NEUTROPHILS NFR BLD AUTO: 69 %
NITRITE UR QL STRIP: NEGATIVE
PH UR STRIP: 6.5 [PH] (ref 5–7.5)
PLATELET # BLD AUTO: 367 X10E3/UL (ref 150–450)
POTASSIUM SERPL-SCNC: 4.6 MMOL/L (ref 3.5–5.2)
PROT SERPL-MCNC: 7.9 G/DL (ref 6–8.5)
PROT UR QL STRIP: NEGATIVE
PROT UR-MCNC: 8.5 MG/DL
RBC # BLD AUTO: 5.45 X10E6/UL (ref 3.77–5.28)
RBC #/AREA URNS HPF: ABNORMAL /HPF (ref 0–2)
SODIUM SERPL-SCNC: 141 MMOL/L (ref 134–144)
SP GR UR STRIP: 1.02 (ref 1–1.03)
TSH SERPL DL<=0.005 MIU/L-ACNC: 0.3 UIU/ML (ref 0.45–4.5)
UROBILINOGEN UR STRIP-MCNC: 0.2 MG/DL (ref 0.2–1)
WBC # BLD AUTO: 8.3 X10E3/UL (ref 3.4–10.8)
WBC #/AREA URNS HPF: ABNORMAL /HPF (ref 0–5)
YEAST #/AREA URNS HPF: PRESENT /[HPF]

## 2022-05-16 ENCOUNTER — HOSPITAL ENCOUNTER (OUTPATIENT)
Dept: NON INVASIVE DIAGNOSTICS | Age: 52
Discharge: HOME | End: 2022-05-16
Attending: INTERNAL MEDICINE
Payer: COMMERCIAL

## 2022-05-16 ENCOUNTER — HOSPITAL ENCOUNTER (OUTPATIENT)
Dept: CT IMAGING | Age: 52
Discharge: HOME | End: 2022-05-16
Attending: INTERNAL MEDICINE
Payer: SELF-PAY

## 2022-05-16 VITALS
HEIGHT: 66 IN | WEIGHT: 293 LBS | DIASTOLIC BLOOD PRESSURE: 85 MMHG | BODY MASS INDEX: 47.09 KG/M2 | SYSTOLIC BLOOD PRESSURE: 145 MMHG

## 2022-05-16 DIAGNOSIS — R06.02 SHORTNESS OF BREATH: ICD-10-CM

## 2022-05-16 DIAGNOSIS — E66.01 OBESITIES, MORBID (HCC): ICD-10-CM

## 2022-05-16 DIAGNOSIS — R93.1 AGATSTON CAC SCORE, <100: ICD-10-CM

## 2022-05-16 DIAGNOSIS — R07.89 ATYPICAL CHEST PAIN: ICD-10-CM

## 2022-05-16 DIAGNOSIS — R94.31 ABNORMAL EKG: ICD-10-CM

## 2022-05-16 LAB
ECHO AO ASC DIAM: 2.9 CM
ECHO AO ASCENDING AORTA INDEX: 1.18 CM/M2
ECHO AV MEAN GRADIENT: 6 MMHG
ECHO AV MEAN VELOCITY: 1.1 M/S
ECHO AV PEAK GRADIENT: 11 MMHG
ECHO AV PEAK VELOCITY: 1.7 M/S
ECHO AV VELOCITY RATIO: 0.82
ECHO AV VTI: 34.8 CM
ECHO LA DIAMETER INDEX: 1.14 CM/M2
ECHO LA DIAMETER: 2.8 CM
ECHO LV E' LATERAL VELOCITY: 9 CM/S
ECHO LV E' SEPTAL VELOCITY: 11 CM/S
ECHO LV FRACTIONAL SHORTENING: 20 % (ref 28–44)
ECHO LV INTERNAL DIMENSION DIASTOLE INDEX: 1.88 CM/M2
ECHO LV INTERNAL DIMENSION DIASTOLIC: 4.6 CM (ref 3.9–5.3)
ECHO LV INTERNAL DIMENSION SYSTOLIC INDEX: 1.51 CM/M2
ECHO LV INTERNAL DIMENSION SYSTOLIC: 3.7 CM
ECHO LV IVSD: 1 CM (ref 0.6–0.9)
ECHO LV MASS 2D: 217.4 G (ref 67–162)
ECHO LV MASS INDEX 2D: 88.7 G/M2 (ref 43–95)
ECHO LV POSTERIOR WALL DIASTOLIC: 1.5 CM (ref 0.6–0.9)
ECHO LV RELATIVE WALL THICKNESS RATIO: 0.65
ECHO LVOT AV VTI INDEX: 0.78
ECHO LVOT MEAN GRADIENT: 3 MMHG
ECHO LVOT PEAK GRADIENT: 7 MMHG
ECHO LVOT PEAK VELOCITY: 1.4 M/S
ECHO LVOT VTI: 27.2 CM
ECHO MV A VELOCITY: 0.81 M/S
ECHO MV E DECELERATION TIME (DT): 159.9 MS
ECHO MV E VELOCITY: 0.82 M/S
ECHO MV E/A RATIO: 1.01
ECHO MV E/E' LATERAL: 9.11
ECHO MV E/E' RATIO (AVERAGED): 8.28
ECHO MV E/E' SEPTAL: 7.45
ECHO PV MAX VELOCITY: 1.1 M/S
ECHO PV PEAK GRADIENT: 5 MMHG

## 2022-05-16 PROCEDURE — 93306 TTE W/DOPPLER COMPLETE: CPT | Performed by: INTERNAL MEDICINE

## 2022-05-16 PROCEDURE — 93306 TTE W/DOPPLER COMPLETE: CPT

## 2022-05-16 PROCEDURE — 75571 CT HRT W/O DYE W/CA TEST: CPT

## 2022-05-16 PROCEDURE — 74011250636 HC RX REV CODE- 250/636: Performed by: INTERNAL MEDICINE

## 2022-05-16 RX ADMIN — PERFLUTREN 2 ML: 6.52 INJECTION, SUSPENSION INTRAVENOUS at 15:00

## 2022-05-16 NOTE — PROGRESS NOTES
Echo looks good---normal pumping heart strength, valves look good and healthy.   Await coronary calcium score

## 2022-05-17 ENCOUNTER — TELEPHONE (OUTPATIENT)
Dept: CARDIOLOGY CLINIC | Age: 52
End: 2022-05-17

## 2022-05-17 NOTE — TELEPHONE ENCOUNTER
----- Message from Jairon Mullen NP sent at 5/17/2022  8:29 AM EDT -----  Coronary calcium score of 0-high negative predictive value. It appears her coronaries are likely normal with no plaque at all    Chest CT also showed some right middle lobe (lung) atelectasis which means that this part of the lungs is partly collapsed--sometimes because of inability to take deep breaths or this could mean developing pneumonia. If she has sob / fever/cough, recommend follow up with pcp for further work up.

## 2022-05-17 NOTE — TELEPHONE ENCOUNTER
----- Message from Madison Gonzalez NP sent at 5/16/2022  5:58 PM EDT -----  Echo looks good---normal pumping heart strength, valves look good and healthy.   Await coronary calcium score

## 2022-05-17 NOTE — PROGRESS NOTES
Coronary calcium score of 0-high negative predictive value. It appears her coronaries are likely normal with no plaque at all    Chest CT also showed some right middle lobe (lung) atelectasis which means that this part of the lungs is partly collapsed--sometimes because of inability to take deep breaths or this could mean developing pneumonia. If she has sob / fever/cough, recommend follow up with pcp for further work up.

## 2022-05-23 ENCOUNTER — TRANSCRIBE ORDER (OUTPATIENT)
Dept: REGISTRATION | Age: 52
End: 2022-05-23

## 2022-05-23 ENCOUNTER — HOSPITAL ENCOUNTER (OUTPATIENT)
Dept: GENERAL RADIOLOGY | Age: 52
Discharge: HOME | End: 2022-05-23
Payer: COMMERCIAL

## 2022-05-23 DIAGNOSIS — J98.11 ATELECTASIS: ICD-10-CM

## 2022-05-23 DIAGNOSIS — J98.11 ATELECTASIS: Primary | ICD-10-CM

## 2022-05-23 PROCEDURE — 71046 X-RAY EXAM CHEST 2 VIEWS: CPT

## 2022-09-26 ENCOUNTER — CLINICAL SUPPORT (OUTPATIENT)
Dept: DIABETES SERVICES | Age: 52
End: 2022-09-26
Payer: COMMERCIAL

## 2022-09-26 DIAGNOSIS — E11.9 TYPE 2 DIABETES MELLITUS WITHOUT COMPLICATION, WITHOUT LONG-TERM CURRENT USE OF INSULIN (HCC): Primary | ICD-10-CM

## 2022-09-26 PROCEDURE — G0108 DIAB MANAGE TRN  PER INDIV: HCPCS

## 2022-09-26 NOTE — PROGRESS NOTES
[facility]  Diabetes Self-Management Education & Support Program    Reason for Referral: DSMES  Referral Source: Gloria Baker MD  Services requested: DSMES       ASSESSMENT    From my perspective, the participant would benefit from [facility] specifically related to reducing risks, healthy eating, monitoring, taking medications, physical activity, healthy coping, and problem solving. Will adapt DSMES program to build on participant's skills score, confidence score, and preparedness score as noted in the Diabetes Skills, Confidence, and Preparedness Index. During the program, we will focus on providing DSMES that specifically addresses participant's interest in healthy eating, as shown by their reported readiness to change. The participant would be best served by attending weekly individual sessions. Diabetes Self-Management Education Follow-up Visit: 10/10/22       Clinical Presentation  Hang Thomas is a 45 y.o. White female referred for diabetes self-management education. Participant has Type 2 DM not on insulin for <1 year. Family history positivefor diabetes. Patient reports not receiving DSMES services in the past. Most recent A1c value: No results found for: HBA1C, [id number], [id number]      Diabetes-related medications:  Current dosing:   Key Antihyperglycemic Medications       Patient is on no antihyperglycemic meds. Blood Pressure Management  Key ACE/ARB Medications       Patient is on no ACE or ARB meds. Lipid Management  Key Antihyperlipidemia Meds       The patient is on no antihyperlipidemia meds. Clot Prevention  Key Anti-Platelet Anticoagulant Meds       The patient is on no antiplatelet meds or anticoagulants.             Learning Assessment  Learning objectives Educator assessment (9/26/2022)   Diabetes Disease Process  The participant can   A) describe diabetes in basic terms;   B) state the type of diabetes they have; &   C) state accepted blood glucose targets. Healthy Eating  The participant can   A) identify carbohydrate foods; &   B) accurately read food labels. Being Active  The participant can  A) state the benefits of physical activity;  B) report their current PA practices;  C) identify PA they would consider incorporating in their lives; &  D) develop an implementation plan. Monitoring  The participant can  A) operate their blood glucose meter; &  B) describe how they log their blood glucoses to share with their provider. Taking Medications  The participant can  A) name their diabetes medications;  B) state the purpose and dose;  C) note side effects; &  D) describe proper storage, disposal & transport (if appropriate). Healthy Coping  The participant can    A) describe their response to diabetes diagnosis; B) describe their specific coping mechanisms;  C) identify supportive people and/or other resources that positively support their diabetes self-care and health. Reducing Risks  The participant can describe the preventive measures used by providers to promote health and prevent diabetes complications. Problem Solving  The participant can   A) identify signs, symptoms & treatment of hypoglycemia;    B) identify signs, symptoms & treatment of hyperglycemia;  C) describe their sick day plan; &  D) identify BG patterns to discuss with their provider.        Yes  Yes  No        Yes  No        Yes  No  Yes  Yes        Yes  No        Yes  Yes  Yes  No        Yes  Yes  Yes        Yes          Yes  Yes  No  Yes     Characteristics to Learning   Barriers to Learning      None     Favorite Ways to Learn   [x] Lecture  [] Slides  [x] Reading [] Video-Internet  [] Cassettes/CDs/MP3's  [] Interactive Small Groups [] Other       Behavioral Assessment  Current self-care practices  Educator assessment (9/26/2022)   Healthy Eating   Current practices    24-hour Dietary Recall:  Breakfast: Muffins, eggs, milk (skim)  Lunch: white chicken chili, fritos, water  Dinner: bowl of cheerios, darya cake (not typical); chicken or beef, vegetables, potatoes or bread  Snacks: cookies (after work)  Beverages: water  Alcohol: none       Would benefit from DSMES related to Healthy Eating: Yes    Eats a carbohydrate controlled diet: No    Stage of change: Preparation      Being Active  Current practices  How many days during the past week have you performed physical activity where your heart beats faster and your breathing is harder than normal for 30 minutes or more?  0 day(s)    How many days in a typical week do you perform activity such as this?  0 day(s)     Would benefit from [facility] related to Being Active: Yes}      Exercises 150 minutes/week: No      Stage of change: Contemplation     Monitoring  Current practices  Do you monitor your blood sugar? No    Do you know your last A1c measurement? Yes    Do you know the meaning of the A1c? Yes     Would benefit from [facility] related to Monitoring: Yes    Not checking BG at this time. Has monitor. Plans to check. Diagnosed 2 weeks ago    Uses BG readings to establish trends and understand BG patterns: No      Stage of change: Preparation        A1C: 6.9% 8/25/22   Taking Medication  Current practices  Do you understand what your diabetes medications do? Yes    How often do you miss doses of your diabetes medications? never    Can you afford your diabetes medications? Yes   Would benefit from [facility] related to Taking Medication: Yes      Takes medications consistently to receive full benefit: Yes      Stage of change: Action    Takes metformin XR   Healthy Coping   Current state  Diabetes Skills, Confidence and Preparedness Index:   Total score: 5.8  Skills: 5.8  Confidence: 5.7  Preparedness: 6.0       Would benefit from DSMES related to Healthy Coping: Yes      Identifies specific people, organizations,etc, that actively support their diabetes self-care efforts: Yes      Stage of change: Preparation     Reducing Risks  Current state  Vaccines:  Influenza:   Immunization History   Administered Date(s) Administered    Influenza Vaccine 10/20/2014       Pneumococcal: There is no immunization history for the selected administration types on file for this patient. Hepatitis: There is no immunization history for the selected administration types on file for this patient. Examinations:  No Diabetic HM Topics for this patient     Dental exam: last appointment was: 3 months ago    Foot exam: due    Heart Protection:  BP Readings from Last 2 Encounters:   05/16/22 (!) 145/85   05/09/22 (!) 145/85        No results found for: LDL, LDLC, DLDLP     Kidney Protection:  No results found for: MCACR, MCA1, MCA2, MCA3, MCAU, MCAU2, MCALPOCT     Would benefit from [facility] related to Reducing Risks: Yes      Actively participates in decision-making with provider regarding secondary prevention:  Yes      Stage of change: Preparation   Problem Solving  Current state  Hypoglycemia Management:  What are signs and symptoms of hypoglycemia that you experience: Dizziness/light-headedness, Sweat/clammy skin, Intense hunger    How do you prevent hypoglycemia: take medications as instructed    How do you treat hypoglycemia: Rule of 15    Hyperglycemia Management:  What are signs and symptoms of hyperglycemia that you experience:  hard to determine if lupus or hyperglycemia    How can you prevent hyperglycemia: take medications as instructed    Sick Day Management:  What do you do differently on sick days:  Stay hydrated    Pattern Management:  Do you notice blood glucose patterns when you look at the readings in your meter or logbook? No    How do you use the blood glucose readings from your meter or logbook?   Not checking     Would benefit from [facility] related to Problem Solving: Yes      Articulates appropriate strategies to address hypoglycemia, hyperglycemia, sick day care and BG pattern: No      Stage of change: Preparation       Note: Content derived from the American Association of Diabetes Educators' Diabetes Education Curriculum: A Guide to Successful Self-Management (3rd edition)      Yajaira Crooks RD on 9/26/2022 at 1:14 PM    I have personally reviewed the health record, including provider notes, laboratory data and current medications before making these care and education recommendations. The time spent in this effort is included in the total time. Total minutes: 60    [facility] Logo  Diabetes Skills, Confidence & Preparedness Index (SCPI) ©  Thank you for completing the Skills, Confidence & Preparedness Index! Below are your scores. All scales and questions are out of 7. If you would like these results emailed, please enter your email address along with some identifying patient information. Email:    Patient Identifier:        Overall SCPI score: 5.8 Skills Score: 5.8  Low: Problem Solving(Q6),Reducing Risks(Q9) Confidence Score: 5.7  Low: Being Active(Q5),Blood Sugar Monitoring(Q6) Preparedness Score: 6.0  Low: Being Active(Q2)  Healthy Eating Score: 6.8  Low: Preparedness(Q1) Taking Medication Score: 7.0  Low: Skills(Q2) Blood Sugar Monitoring Score: 5.8  Low: Confidence(Q6) Reducing Risks Score: 5.8  Low: EVDCDQ(A3)  Problem Solving Score: 5.0  Low: Skills(Q6) Healthy Coping Score: 6.0  Low: Confidence(Q2) Being Active Score: 4.5  Low: Confidence(Q5)    Skills/Knowledge Questions  1. I know how to plan meals that have the best balance between carbohydrates, proteins and vegetables. 7  2. I know how my diabetes medications (pills, injectables and/or insulin) work in my body. 7  3. I know when to check my blood sugar if I want to see how my body responded to a meal. 5  4. I know when to check my blood sugars to determine if my medication or insulin doses are correct. 7  5. I know what to do to prevent a low blood sugar when I exercise (either before, during, or after). 7  6.  When I am sick, I know what to do differently with my diabetes management. 3  7. I know how stress can affect my diabetes management. 7  8. When I look at my blood sugars over a given week, I can explain what my blood sugar pattern is. 6  9. I know what my target levels are for A1c, blood pressure and cholesterol. 3  Confidence Questions  1. I am confident that I can plan balanced meals and snacks. 7  2. I am confident that I can manage my stress. 5  3. I am confident that I can prevent a low blood sugar during or after exercise. 7  4. I am confident that the next time I eat out, I will be able to choose foods that best keep my blood sugars in target. 7  5. I am confident I can include exercise into my schedule. 4  6. I am confident that I can use my daily blood sugars to adjust my diet, my activity, and/or my insulin. 4  7. When something out of my normal routine happens, I am confident that I can problem-solve and keep my diabetes on track. 6  Preparedness Questions  1. Within the next month, I will begin to eat more balanced meals and snacks. 6  2. Within the next month, I will choose an exercise activity and I will start fitting it into my schedule. 5  3. Within the next month, I will make a list of stress management options that work for me. 6  4. Within the next month, I will consistently plan ahead to prevent low blood sugars. 6  5. Within the next month, I will start adjusting my insulin doses on my own. 0  6. Within the next month, I will begin making changes to my diabetes management based on my daily blood sugars (eg - eating, activity and/or insulin). 7  7. Within the next month, I will begin making changes to my diabetes management to meet my overall goals (eg - eating, activity and/or insulin).  6

## 2022-10-10 ENCOUNTER — CLINICAL SUPPORT (OUTPATIENT)
Dept: DIABETES SERVICES | Age: 52
End: 2022-10-10
Payer: COMMERCIAL

## 2022-10-10 DIAGNOSIS — E11.9 TYPE 2 DIABETES MELLITUS WITHOUT COMPLICATION, WITHOUT LONG-TERM CURRENT USE OF INSULIN (HCC): Primary | ICD-10-CM

## 2022-10-10 PROCEDURE — G0108 DIAB MANAGE TRN  PER INDIV: HCPCS

## 2022-10-10 NOTE — PROGRESS NOTES
[facility]  Diabetes Self-Management Education & Support Program  Encounter Note    SUMMARY  Diabetes self-care management training was completed related to healthy eating and monitoring. he participant will return on October 31 to continue DSMES related to physical activity and healthy coping. The participant did not identify SMART Goal(s) and will practice knowledge and skills related to healthy eating and monitoring to improve diabetes self-management. EVALUATION:  Participant brought spouse today for extra support. Now checking BG ranging 93-159mg/dL  Time of day for BG checks varies. Covered Healthy Eating and Monitoring. Knows targets, s/s hypo/hyperglycemia and treatment. Is still experiencing side effects from metformin. Advised to call provider. Practiced building a healthy using food models. Covered CHO counting and apps for smart phone. RECOMMENDATIONS:  Contact provider regarding metformin   Continue checking BG  Practice building a healthy plate     TOPICS DISCUSSED TODAY:  WHAT CAN I EAT? 30  HOW CAN BLOOD GLUCOSE MONITORING HELP ME? 30      Next provider visit is scheduled for DSMES 10/31/22       DATE DSMES TOPIC EVALUATION     10/10/2022 WHAT CAN I EAT? General principles   Determining a healthy weight   Nutritional terms & tools   Healthy Plate method   Carbohydrate Counting   Reading food labels   Free apps   Pregnancy recommendations      The participant   Uses Healthy Plate principles in constructing meals: Yes  Reads food labels in choosing acceptable foods: Yes    The participant needs to address consuming 3-4 servings of CHO per meal and monitor BG. DATE DSMES TOPIC EVALUATION     10/10/2022 HOW CAN BLOOD GLUCOSE MONITORING HELP ME?    Value of blood glucose monitoring   Realistic expectations   Blood glucose monitoring targets   Target adjustments   Setting a1c & blood glucose targets with provider   Meter selection    Technique for obtaining blood droplet   Blood glucose testing sites   Determining best times to test   Pregnancy recommendations   Data sharing with provider        The participant   Can demonstrate their glucometer procedure: Yes  Logs their BG readings:  Yes    The participant needs to address check fasting and 2 hours after a meal couple times a week. Jose Gusman RD on 10/10/2022 at 9:44 AM    I have personally reviewed the health record, including provider notes, laboratory data and current medications before making these care and education recommendations. The time spent in this effort is included in the total time.   Total minutes: 60

## 2022-10-31 ENCOUNTER — CLINICAL SUPPORT (OUTPATIENT)
Dept: DIABETES SERVICES | Age: 52
End: 2022-10-31
Payer: COMMERCIAL

## 2022-10-31 DIAGNOSIS — E11.9 TYPE 2 DIABETES MELLITUS WITHOUT COMPLICATION, WITHOUT LONG-TERM CURRENT USE OF INSULIN (HCC): Primary | ICD-10-CM

## 2022-10-31 PROCEDURE — G0108 DIAB MANAGE TRN  PER INDIV: HCPCS

## 2022-11-01 NOTE — PROGRESS NOTES
[facility]  Diabetes Self-Management Education & Support Program  Encounter Note    SUMMARY  Diabetes self-care management training was completed related to What is Diabetes and  healthy eating. The participant will return on November 10 to continue DSMES related to reducing risks and taking medications. The participant did not identify SMART Goal(s) and will practice knowledge and skills related to healthy eating to improve diabetes self-management. EVALUATION:  Shared BG lo day average before meal 118mg/dL, 30 day average 2hrs after meal 129mg/dL. Only receives 25 strips per month. Will reach out to provider to see if insurance will cover additional strips. If not, will reduce amount of BG checks. Spouse attended last session and has been very supportive. Participant's weight is under 300# for the first time in 20 years. Participant concerned about upcoming birthdays/holidays as she does the cooking. Reviewed menu for upcoming events and devised a plan using the healthy plate method. Discussed how to include family's favorite dishes and stay within her CHO count range. Participant requested scheduling appointments in between celebrations to assist with staying on track. RECOMMENDATIONS:  Continue DSMES     TOPICS DISCUSSED TODAY:  WHAT CAN I EAT? 60      Next provider visit is scheduled for DSMES 11/10/22             DATE DSMES TOPIC EVALUATION     2022 WHAT CAN I EAT? General principles   Determining a healthy weight   Nutritional terms & tools   Healthy Plate method   Carbohydrate Counting   Reading food labels   Free apps   Pregnancy recommendations      The participant   Uses Healthy Plate principles in constructing meals: Yes  Reads food labels in choosing acceptable foods: Yes        Has been trying new recipes. Finding alternative for favorite recipes.   Counting CHO (45-60g per meal)     Vivien Garner RD on 2022 at 9:57 AM    I have personally reviewed the health record, including provider notes, laboratory data and current medications before making these care and education recommendations. The time spent in this effort is included in the total time.   Total minutes: 60

## 2022-11-10 ENCOUNTER — TRANSCRIBE ORDER (OUTPATIENT)
Dept: REGISTRATION | Age: 52
End: 2022-11-10

## 2022-11-10 ENCOUNTER — HOSPITAL ENCOUNTER (OUTPATIENT)
Dept: MAMMOGRAPHY | Age: 52
Discharge: HOME | End: 2022-11-10
Payer: COMMERCIAL

## 2022-11-10 DIAGNOSIS — Z12.31 VISIT FOR SCREENING MAMMOGRAM: Primary | ICD-10-CM

## 2022-11-10 DIAGNOSIS — Z12.31 VISIT FOR SCREENING MAMMOGRAM: ICD-10-CM

## 2022-11-10 PROCEDURE — 77063 BREAST TOMOSYNTHESIS BI: CPT

## 2022-12-12 ENCOUNTER — CLINICAL SUPPORT (OUTPATIENT)
Dept: DIABETES SERVICES | Age: 52
End: 2022-12-12
Payer: COMMERCIAL

## 2022-12-12 DIAGNOSIS — E11.9 TYPE 2 DIABETES MELLITUS WITHOUT COMPLICATION, WITHOUT LONG-TERM CURRENT USE OF INSULIN (HCC): Primary | ICD-10-CM

## 2022-12-12 PROCEDURE — G0108 DIAB MANAGE TRN  PER INDIV: HCPCS

## 2022-12-12 NOTE — PROGRESS NOTES
[facility]  Diabetes Self-Management Education & Support Program  Encounter Note    SUMMARY  Diabetes self-care management training was completed related to healthy eating and monitoring. The participant will return on January 9 to continue DSMES related to reducing risks and taking medications. The participant did identify SMART Goal(s) and will practice knowledge and skills related to healthy eating and monitoring to improve diabetes self-management. EVALUATION:  Participant continues to lose weight. Fasting BG averaging 110s. Stated that her daughter has been very supportive and she is not feeling as embarrassed about her DM dx. She is concerned about her cravings which is triggering some anxiety of gaining the weight back. She plans to set up appointment with a counselor to discuss her relationship with food overall. Participant is trying to stay between 40-60g CHO per meal.  24hr diet recall revealed that participant needs to increase non-starchy vegetables at lunch. Discussed some strategies to cope with cravings. Plans to implement a physical activity program using the [employer] machine at home. Discussed starting out with 15 minutes once a week and gradually increase. Reviewed the impact of physical activity on BG monitoring. RECOMMENDATIONS:  Continue current range of CHO intake at meals  Add more non-starchy vegetables at lunch  Implement small amounts of physical activity     TOPICS DISCUSSED TODAY:  WHAT CAN I EAT? 30  HOW CAN BLOOD GLUCOSE MONITORING HELP ME? 30      Next provider visit is scheduled for [facility] 1/9/23       SMART GOAL(S)   Set up appointment with counselor by next visit. ACHIEVEMENT OF GOAL(S) : Goal set 12/12/22     DATE DSMES TOPIC EVALUATION     12/12/2022 WHAT CAN I EAT?    General principles   Determining a healthy weight   Nutritional terms & tools   Healthy Plate method   Carbohydrate Counting   Reading food labels   Free apps Pregnancy recommendations      The participant   Uses Healthy Plate principles in constructing meals: Yes  Reads food labels in choosing acceptable foods: Yes    The participant needs to address adding more non-starchy vegetables at lunch. DATE DSMES TOPIC EVALUATION     12/12/2022 HOW CAN BLOOD GLUCOSE MONITORING HELP ME? Value of blood glucose monitoring   Realistic expectations   Blood glucose monitoring targets   Target adjustments   Setting a1c & blood glucose targets with provider   Meter selection    Technique for obtaining blood droplet   Blood glucose testing sites   Determining best times to test   Pregnancy recommendations   Data sharing with provider        The participant   Can demonstrate their glucometer procedure: Yes  Logs their BG readings:  Yes           Kristie Garza RD on 12/12/2022 at 9:46 AM    I have personally reviewed the health record, including provider notes, laboratory data and current medications before making these care and education recommendations. The time spent in this effort is included in the total time.   Total minutes: 60

## 2023-01-09 ENCOUNTER — CLINICAL SUPPORT (OUTPATIENT)
Dept: DIABETES SERVICES | Age: 53
End: 2023-01-09
Payer: COMMERCIAL

## 2023-01-09 DIAGNOSIS — E11.9 TYPE 2 DIABETES MELLITUS WITHOUT COMPLICATION, WITHOUT LONG-TERM CURRENT USE OF INSULIN (HCC): Primary | ICD-10-CM

## 2023-01-09 PROCEDURE — G0108 DIAB MANAGE TRN  PER INDIV: HCPCS

## 2023-01-09 NOTE — PROGRESS NOTES
[employer] [facility]  Diabetes Self-Management Education & Support Program  Encounter Note    SUMMARY  Diabetes self-care management training was completed related to taking medications and physical activity. The participant will return on February 13 to continue DSMES related to healthy coping and problem solving. The participant did not identify SMART Goal(s) and will practice knowledge and skills related to being active and medications to improve diabetes self-management. EVALUATION:  Fasting BG ranging 115-123mg/dL. Average . Expressed understanding of diabetes medications, signs, symptoms, and treatment of hypoglycemia. States understanding of the positive impact physical activity has on BG. Participant is very pleased with the amount of weight loss she has achieved. Has been practicing the healthy plate model and keeping CHO between 45-60g per meal.  Is working on her relationship with food in general and practicing mindful eating. Plans to join a gym to get back into swimming. RECOMMENDATIONS:  Continue DSMES     TOPICS DISCUSSED TODAY:  HOW DO MY DIABETES MEDICATIONS WORK? 30  HOW DOES PHYSICAL ACTIVITY AFFECT MY DIABETES? 30      Next provider visit is scheduled for DSMES 2/13/23- virtual         DATE DSMES TOPIC EVALUATION     1/9/2023 HOW DO MY DIABETES MEDICATIONS WORK?    Type 1 medications & methods   Insulin injections   Injection sites   Type 2 medications   Oral agents   GLP-1 agonists   Hypoglycemia symptoms & treatment   Glucagon emergency kits   General guidance regarding insulin use whether Type 1, 2 or gestational diabetes   Storage of insulin   Disposal    Traveling with medications   Barriers to medication adherence      The participant   Can describe the expected action & side effects of prescribed diabetes medications: Yes  Can demonstrate injection technique (if applicable): No N/A         DATE DSMES TOPIC EVALUATION     1/9/2023 HOW DOES PHYSICAL ACTIVITY AFFECT MY DIABETES? Benefits of physical activity   Beginning a program of physical activity   Walking   Pedometers   Goal setting   Structured physical activity program   Aerobic activity   Resistance   Flexibility   Balance   Physical activity program progression   Safety issues   Barriers to physical activity   Facilitators of physical activity        The participant has established a regular physical activity plan: No    The participant needs to address establishing a physical activity plan. Kalyan Downs RD on 1/9/2023 at 4:43 PM    I have personally reviewed the health record, including provider notes, laboratory data and current medications before making these care and education recommendations. The time spent in this effort is included in the total time.   Total minutes: 60

## 2023-02-13 ENCOUNTER — VIRTUAL VISIT (OUTPATIENT)
Dept: DIABETES SERVICES | Age: 53
End: 2023-02-13
Payer: COMMERCIAL

## 2023-02-13 DIAGNOSIS — E11.9 TYPE 2 DIABETES MELLITUS WITHOUT COMPLICATION, WITHOUT LONG-TERM CURRENT USE OF INSULIN (HCC): Primary | ICD-10-CM

## 2023-02-13 PROCEDURE — G0108 DIAB MANAGE TRN  PER INDIV: HCPCS

## 2023-02-13 NOTE — PROGRESS NOTES
[employer] [facility]  Diabetes Self-Management Education & Support Program  Encounter Note    SUMMARY  Diabetes self-care management training was completed related to reducing risks, healthy coping, and problem solving. The participant will return on March 27 to continue DSMES for post program assessment. The participant did identify SMART Goal(s) and will practice knowledge and skills related to reducing risks, healthy coping, and problem solving to improve diabetes self-management. EVALUATION:  Stated that prior to [facility] she was feeling anger, pain and guilt but now is at the \"moving on\" stage of diabetes grief. Shared she feels supported in her diabetes health by family members, coworkers and providers. Uses several techniques to reduce stress such as time in nature (loves the beach), prioritizing tasks, delegating some tasks, and connects socially. Plans to use ADA online nutrition, diabetes food hub, and diabetes publications as part of her diabetes self-management support plan. Had symptoms of hypoglycemia recently. BG was 104mg/dL. Although BG not below 70 she was able to identify her meal 4-5 hours prior was not balanced and possibly contributed to s/s of hypoglycemia. Participant treated with rice krispy treat which had 16g CHO. Reviewed appropriate treatment for hypoglycemia. Briefly discussed reducing risks. Participant already well versed on complications from diabetes as some family members had T2. She is up to date on dental care and is due for dilated eye exam.  Also, participant has lost 37 pounds since August of 2022. RECOMMENDATIONS:  Continue to work on balancing meals  Continue using skills learned in [address]? 30  HOW DO I FIND SUPPORT TO TACKLE THIS CONDITION? 30  HOW DO I FIGURE OUT WHAT'S INFLUENCING MY BLOOD GLUCOSES? 30      Next provider visit is scheduled for needs to set up appt.        Gild GOAL(S)  Set up appointment with counselor by next visit. ACHIEVEMENT OF GOAL(S) : %     DATE DSMES TOPIC EVALUATION     2/13/2023 HOW DO I STAY HEALTHY? Prevention   Vaccinations   Preconception care (if applicable)  Examinations   Eye    Foot   Diabetic complications' prevention   Dental health   Heart health   Kidney Health   Nerve health   Sleep health      The participant has a personal diabetes care record to keep abreast of diabetes health No     The participant needs to address establishing diabetes personal care record. DATE DSMES TOPIC EVALUATION     2/13/2023 HOW DO I FIND SUPPORT TO TACKLE THIS CONDITION? Normal responses to diabetes diagnosis or complication   Shock   Anger & resentment   Guilt/self-blame   Sadness & worry   Depression    Anxiety   Pregnancy   Constructive strategies to normal responses    Exploring feelings & attitudes   Motivation: Cost versus benefits analysis   Problem-solving: Chain analysis   Obtaining support: Communicating   Family & friends   Health team   Community   Stress   Symptoms   Managing stress   Fill your tool kit        The participant can identify people that support them in caring for their diabetes health: patient, spouse, daughter, and mother in law, coworkers      The participant would like to pursue the following in keeping themselves healthy after completing the program:  ADA online nutrition, diabetes food hub and publications         DATE DSMES TOPIC EVALUATION     2/13/2023 HOW DO I FIGURE OUT WHAT'S INFLUENCING MY BLOOD GLUCOSES?    Problem solving using SOAR   Set goals   Sort options   Arrive at a plan   Reaffirm plan   Common problems in diabetes self-care   Hypoglycemia   Hyperglycemia   Sick days   Pattern management   Disaster preparedness plan        The participant has an action plan for   Hypoglycemia: Yes  Hyperglycemia: Yes  Sick days: Yes  During disasters: No    The participant needs to address appropriate treatment for hypoglycemia and establish a diabetes disaster kit. Nevaeh Ramirez RD on 2/13/2023 at 10:27 AM    I have personally reviewed the health record, including provider notes, laboratory data and current medications before making these care and education recommendations. The time spent in this effort is included in the total time.   Total minutes: 60

## 2023-03-01 ENCOUNTER — TELEPHONE (OUTPATIENT)
Dept: RHEUMATOLOGY | Age: 53
End: 2023-03-01

## 2023-03-01 NOTE — TELEPHONE ENCOUNTER
PT called regarding an accomodation letter, I informed her per Eileen that Leobardo Martins already faxed it to the appropriate location.

## 2023-03-27 ENCOUNTER — VIRTUAL VISIT (OUTPATIENT)
Dept: DIABETES SERVICES | Age: 53
End: 2023-03-27
Payer: COMMERCIAL

## 2023-03-27 DIAGNOSIS — E11.9 TYPE 2 DIABETES MELLITUS WITHOUT COMPLICATION, WITHOUT LONG-TERM CURRENT USE OF INSULIN (HCC): Primary | ICD-10-CM

## 2023-03-27 PROCEDURE — G0108 DIAB MANAGE TRN  PER INDIV: HCPCS

## 2023-03-27 NOTE — PROGRESS NOTES
[employer] [facility] [employer] [facility]  Diabetes Self-Management Education & Support Program  Post-program Evaluation    EVALUATION @ COMPLETION OF THE PROGRAM    Kit Maki completed 6 hours of diabetes self-management education. The participant acquired new knowledge and demonstrated new skills during the program.     The participant worked on the following SMART GOAL(s) to improve their diabetes self-management:    Set up appointment with counselor by next visit. ACHIEVEMENT OF GOAL(S) : %    The participant's pre-program A1c was No results found for: HBA1C, [id number], [id number]. The post-evaluation A1c is 5.9; Pre-program: 6.9 (8/5/22). The participant improved their Diabetes Skills, Confidence and Preparedness Index (scored out of 7): Total score: 6.9  Skills: 6.8  Confidence: 6.9  Preparedness: 7.0    FINAL RECOMMENDATIONS:  Continue to work on weight loss goal and consistent CHO intake     Next provider visit is scheduled for chitra Rivera RD on 3/27/2023     Metric Patient's responses (3/27/2023) Areas for improvement     Healthy Eating       The participant is using the Healthy Plate to control carbohydrate intake - Yes    The participant reads food labels accurately - Yes  Participant is averaging 45g CHO per meal and has started being mindful of calories. Being Active       The participant performs physical activity where your heart beats faster and your breathing is harder than normal for 30 minutes or more? 5 day(s)     In a typical week, the participant performs physical activity 5 day(s)  Participant has increased physical activity significantly. Walking and riding her bike. Goal weight is at least 250# so she can meet criteria for indoor lorenzo diving. Next goal is 225# to meet criteria for zip lining. Current weight is 267#        Monitoring   The participant is monitoring their blood sugars?  Yes    The participant is monitoring 1x/day    Blood sugar ranges are 120s mg/dL    The participant improved their A1c - Yes    The participant understands the meaning of the A1c - Yes          Taking Medications   The participant understands what their diabetes medications do Yes    The participant can afford your diabetes medications Yes    The participant does not miss doses of their diabetes medications - never          Healthy Coping     The participant feels supported by family, friends and others related to their diabetes self-care practices - Yes    The participant plans on utilizing the following community resources after completion of the program: ADA Nutrition        Reducing Risks   Vaccines:  Influenza:   Immunization History   Administered Date(s) Administered    Influenza Vaccine 10/20/2014       Pneumococcal: There is no immunization history for the selected administration types on file for this patient. Hepatitis: There is no immunization history for the selected administration types on file for this patient. Examinations:  Diabetic Foot and Eye Exam HM Status   Topic Date Due    Diabetic Foot Care  Never done        Dental exam: last appointment was: beginning of March    Foot exam: done on: 1 month ago    Heart Protection:  BP Readings from Last 2 Encounters:   05/16/22 (!) 145/85   05/09/22 (!) 145/85        No results found for: LDL, LDLC, DLDLP     Key Anti-Platelet Anticoagulant Meds       The patient is on no antiplatelet meds or anticoagulants.              Kidney Protection:  No results found for: Samia Mott, MCA2, Charlie [address], MCAU, [address] [facility], MCALPOCT   The participant would benefit from additional attention to:    Vaccines:  [] Influenza  [] Pneumococcal  [] Hepatitis    Examinations:  [] Dilated eye exam  [] Dental exam  [] Foot exam    Other:  [] Reviewing long-term complications     Problem Solving   Hypoglycemia Management:  The participant knows the signs and symptoms of hypoglycemia: Shaking/trembling, Dizziness/light-headedness, Weakness    The participant knows how to prevent hypoglycemia: consistent meals/snack time    The participant knows how to treat hypoglycemia: Rule of 15    Hyperglycemia Management:  The participant knows their signs and symptoms of hyperglycemia: Extreme thirst, Frequent urination, Blurred vision     The participant knows how to prevent hyperglycemia: focus on carbohydrate counting/meal planning    Sick Day Management:  The participant knows what to do differently on sick days: Stay hydrated, Check blood glucose every 2-4 hours, consistent CHO intake      Pattern Management:  The participant can notice blood glucose patterns when looking at their blood glucose readings: Yes    How do you use the blood glucose readings from your meter or logbook: understand how body responds to meals      Note: Content derived from the American Association of Diabetes Educators' Diabetes Education Curriculum: A Guide to Successful Self-Management (3rd edition)      I have personally reviewed the health record, including provider notes, laboratory data and current medications before making these care and education recommendations. The time spent in this effort is included in the total time. Total minutes: 30      [facility] Logo  Diabetes Skills, Confidence & Preparedness Index (SCPI) ©  Thank you for completing the Skills, Confidence & Preparedness Index! Below are your scores. All scales and questions are out of 7. If you would like these results emailed, please enter your email address along with some identifying patient information.   Email:    Patient Identifier:        Overall SCPI score: 6.9 Skills Score: 6.8  Low: Problem Solving(Q6),Reducing Risks(Q9) Confidence Score: 6.9  Low: Healthy Coping(Q2) Preparedness Score: 7.0  Low: Healthy Eating(Q1),Being Active(Q2),Healthy Coping(Q3),Reducing Risks(Q4),Blood Sugar Monitoring(Q6),Problem Solving(Q7)  Healthy Eating Score: 7.0  Low: Skills(Q1),Confidence(Q1),Confidence(Q4),Preparedness(Q1) Taking Medication Score: 7.0  Low: Skills(Q2) Blood Sugar Monitoring Score: 7.0  Low: Skills(Q3),Skills(Q4),Skills(Q8),Confidence(Q6),Preparedness(Q6) Reducing Risks Score: 6.8  Low: ICKMCE(D7)  Problem Solving Score: 6.7  Low: Skills(Q6) Healthy Coping Score: 6.7  Low: Confidence(Q2) Being Active Score: 7.0  Low: Confidence(Q5),Preparedness(Q2)    Skills/Knowledge Questions  1. I know how to plan meals that have the best balance between carbohydrates, proteins and vegetables. 7  2. I know how my diabetes medications (pills, injectables and/or insulin) work in my body. 7  3. I know when to check my blood sugar if I want to see how my body responded to a meal. 7  4. I know when to check my blood sugars to determine if my medication or insulin doses are correct. 7  5. I know what to do to prevent a low blood sugar when I exercise (either before, during, or after). 7  6. When I am sick, I know what to do differently with my diabetes management. 6  7. I know how stress can affect my diabetes management. 7  8. When I look at my blood sugars over a given week, I can explain what my blood sugar pattern is. 7  9. I know what my target levels are for A1c, blood pressure and cholesterol. 6  Confidence Questions  1. I am confident that I can plan balanced meals and snacks. 7  2. I am confident that I can manage my stress. 6  3. I am confident that I can prevent a low blood sugar during or after exercise. 7  4. I am confident that the next time I eat out, I will be able to choose foods that best keep my blood sugars in target. 7  5. I am confident I can include exercise into my schedule. 7  6. I am confident that I can use my daily blood sugars to adjust my diet, my activity, and/or my insulin. 7  7. When something out of my normal routine happens, I am confident that I can problem-solve and keep my diabetes on track. 7  Preparedness Questions  1.  Within the next month, I will begin to eat more balanced meals and snacks. 8  2. Within the next month, I will choose an exercise activity and I will start fitting it into my schedule. 8  3. Within the next month, I will make a list of stress management options that work for me. 8  4. Within the next month, I will consistently plan ahead to prevent low blood sugars. 8  5. Within the next month, I will start adjusting my insulin doses on my own. 0  6. Within the next month, I will begin making changes to my diabetes management based on my daily blood sugars (eg - eating, activity and/or insulin). 8  7. Within the next month, I will begin making changes to my diabetes management to meet my overall goals (eg - eating, activity and/or insulin). Sadaf Ha, was evaluated through a synchronous (real-time) audio-video encounter. The patient (or guardian if applicable) is aware that this is a billable service, which includes applicable co-pays. This Virtual Visit was conducted with patient's (and/or legal guardian's) consent. The visit was conducted pursuant to the emergency declaration under the [address] [place] [address] authority and the [employer] General Act. Patient identification was verified, and a caregiver was present when appropriate. The patient was located at: Home: [address] [place] [address] Street  [address] [phone]  The provider was located at:  Facility ([facility] Department): [place] [phone]

## 2024-09-13 ENCOUNTER — HOSPITAL ENCOUNTER (OUTPATIENT)
Facility: HOSPITAL | Age: 54
Discharge: HOME | End: 2024-09-16
Payer: COMMERCIAL

## 2024-09-13 ENCOUNTER — TRANSCRIBE ORDERS (OUTPATIENT)
Facility: HOSPITAL | Age: 54
End: 2024-09-13

## 2024-09-13 DIAGNOSIS — R22.41 MASS OF LOWER LEG, RIGHT: Primary | ICD-10-CM

## 2024-09-13 DIAGNOSIS — R22.41 MASS OF LOWER LEG, RIGHT: ICD-10-CM

## 2024-09-13 PROCEDURE — 73590 X-RAY EXAM OF LOWER LEG: CPT

## (undated) DEVICE — UNIVERSAL FIXATION CANNULA: Brand: VERSAONE

## (undated) DEVICE — SYR 3ML LL TIP 1/10ML GRAD --

## (undated) DEVICE — SUTURE SZ 0 27IN 5/8 CIR UR-6  TAPER PT VIOLET ABSRB VICRYL J603H

## (undated) DEVICE — SUTURE VCRL SZ 4-0 L27IN ABSRB UD L19MM PS-2 3/8 CIR PRIM J426H

## (undated) DEVICE — ELECTRODE ES 36CM LAP FLAT L HK COAT DISP CLEANCOAT

## (undated) DEVICE — SURGICAL PROCEDURE KIT GEN LAPAROSCOPY LF

## (undated) DEVICE — SOLUTION IV 500ML 0.9% SOD CHL FLX CONT

## (undated) DEVICE — CATH IV AUTOGRD BC BLU 22GA 25 -- INSYTE

## (undated) DEVICE — Device

## (undated) DEVICE — KENDALL SCD EXPRESS SLEEVES, KNEE LENGTH, MEDIUM: Brand: KENDALL SCD

## (undated) DEVICE — SUT ETHBND 0 18IN MO6 MP GRN --

## (undated) DEVICE — (D)SYR 10ML 1/5ML GRAD NSAF -- PKGING CHANGE USE ITEM 338027

## (undated) DEVICE — 1200 GUARD II KIT W/5MM TUBE W/O VAC TUBE: Brand: GUARDIAN

## (undated) DEVICE — SOLIDIFIER MEDC 1200ML -- CONVERT TO 356117

## (undated) DEVICE — STERILE POLYISOPRENE POWDER-FREE SURGICAL GLOVES WITH EMOLLIENT COATING: Brand: PROTEXIS

## (undated) DEVICE — FORCEPS BX L160CM DIA8MM GRSP DISECT CUP TIP NONLOCKING ROT

## (undated) DEVICE — DRAPE XR C ARM 41X74IN LF --

## (undated) DEVICE — CATHETER URET 4FR L70CM POLYUR OLV FLX TIP KINK RESIST W/

## (undated) DEVICE — SYR 10ML LUER LOK 1/5ML GRAD --

## (undated) DEVICE — APPLICATOR BNDG 1MM ADH PREMIERPRO EXOFIN

## (undated) DEVICE — HANDLE LT SNAP ON ULT DURABLE LENS FOR TRUMPF ALC DISPOSABLE

## (undated) DEVICE — BAG SPEC RETRV 275ML 10ML DISPOSABLE RELIACATCH

## (undated) DEVICE — INFECTION CONTROL KIT SYS

## (undated) DEVICE — TROCAR SITE CLOSURE DEVICE: Brand: ENDO CLOSE

## (undated) DEVICE — BASIN EMESIS 500CC ROSE 250/CS 60/PLT: Brand: MEDEGEN MEDICAL PRODUCTS, LLC

## (undated) DEVICE — TOWEL 4 PLY TISS 19X30 SUE WHT

## (undated) DEVICE — BAG SPEC BIOHZRD 10 X 10 IN --

## (undated) DEVICE — NEEDLE HYPO 22GA L1.5IN BLK S STL HUB POLYPR SHLD REG BVL

## (undated) DEVICE — Z CONVERTED USE 2274299 CUFF BLD PRESSURE LNG MED AD 25-35 CM ARM FLEXIPORT DISP

## (undated) DEVICE — BLADELESS OPTICAL TROCAR WITH FIXATION CANNULA: Brand: VERSAPORT

## (undated) DEVICE — Device: Brand: MEDEX

## (undated) DEVICE — (D)PREP SKN CHLRAPRP APPL 26ML -- CONVERT TO ITEM 371833

## (undated) DEVICE — KENDALL RADIOLUCENT FOAM MONITORING ELECTRODE RECTANGULAR SHAPE: Brand: KENDALL

## (undated) DEVICE — CLIP APPLIER WITH CLIP LOGIC TECHNOLOGY: Brand: ENDO CLIP III

## (undated) DEVICE — STERILE POLYISOPRENE POWDER-FREE SURGICAL GLOVES: Brand: PROTEXIS

## (undated) DEVICE — NEONATAL-ADULT SPO2 SENSOR: Brand: NELLCOR

## (undated) DEVICE — BLADELESS OPTICAL TROCAR WITH FIXATION CANNULA: Brand: VERSAONE

## (undated) DEVICE — SET ADMIN 16ML TBNG L100IN 2 Y INJ SITE IV PIGGY BK DISP

## (undated) DEVICE — MEDI-VAC YANK SUCT HNDL W/TPRD BULBOUS TIP: Brand: CARDINAL HEALTH

## (undated) DEVICE — Z DISCONTINUED PER MEDLINE LINE GAS SAMPLING O2/CO2 LNG AD 13 FT NSL W/ TBNG FILTERLINE

## (undated) DEVICE — TOWEL SURG W17XL27IN STD BLU COT NONFENESTRATED PREWASHED

## (undated) DEVICE — SPONGE HEMOSTAT CELLULS 4X8IN -- SURGICEL

## (undated) DEVICE — CONTAINER SPEC 20 ML LID NEUT BUFF FORMALIN 10 % POLYPR STS

## (undated) DEVICE — NEEDLE HYPO 18GA L1.5IN PNK S STL HUB POLYPR SHLD REG BVL

## (undated) DEVICE — REM POLYHESIVE ADULT PATIENT RETURN ELECTRODE: Brand: VALLEYLAB

## (undated) DEVICE — BLOCK BITE ENDOSCP AD 21 MM W/ DIL BLU LF DISP

## (undated) DEVICE — DEVON™ KNEE AND BODY STRAP 60" X 3" (1.5 M X 7.6 CM): Brand: DEVON